# Patient Record
Sex: FEMALE | Race: WHITE | HISPANIC OR LATINO | Employment: UNEMPLOYED | URBAN - METROPOLITAN AREA
[De-identification: names, ages, dates, MRNs, and addresses within clinical notes are randomized per-mention and may not be internally consistent; named-entity substitution may affect disease eponyms.]

---

## 2024-05-19 ENCOUNTER — HOSPITAL ENCOUNTER (INPATIENT)
Facility: MEDICAL CENTER | Age: 29
LOS: 6 days | DRG: 438 | End: 2024-05-25
Attending: EMERGENCY MEDICINE | Admitting: INTERNAL MEDICINE

## 2024-05-19 ENCOUNTER — APPOINTMENT (OUTPATIENT)
Dept: RADIOLOGY | Facility: MEDICAL CENTER | Age: 29
DRG: 438 | End: 2024-05-19
Attending: EMERGENCY MEDICINE

## 2024-05-19 DIAGNOSIS — R93.5 ABNORMAL US (ULTRASOUND) OF ABDOMEN: ICD-10-CM

## 2024-05-19 DIAGNOSIS — K85.90 PANCREATITIS, UNSPECIFIED PANCREATITIS TYPE: ICD-10-CM

## 2024-05-19 DIAGNOSIS — D50.8 IRON DEFICIENCY ANEMIA SECONDARY TO INADEQUATE DIETARY IRON INTAKE: ICD-10-CM

## 2024-05-19 DIAGNOSIS — R41.3 MEMORY DEFICIT: ICD-10-CM

## 2024-05-19 DIAGNOSIS — F10.21 HISTORY OF ALCOHOLISM (HCC): ICD-10-CM

## 2024-05-19 DIAGNOSIS — R10.9 ACUTE ABDOMINAL PAIN: ICD-10-CM

## 2024-05-19 DIAGNOSIS — R93.5 ABNORMAL CT OF THE ABDOMEN: ICD-10-CM

## 2024-05-19 DIAGNOSIS — R63.0 POOR APPETITE: ICD-10-CM

## 2024-05-19 PROBLEM — E87.6 HYPOKALEMIA: Status: ACTIVE | Noted: 2024-05-19

## 2024-05-19 PROBLEM — G93.40 ENCEPHALOPATHY: Status: ACTIVE | Noted: 2024-05-19

## 2024-05-19 PROBLEM — D69.6 THROMBOCYTOPENIA (HCC): Status: ACTIVE | Noted: 2024-05-19

## 2024-05-19 PROBLEM — R73.9 HYPERGLYCEMIA: Status: ACTIVE | Noted: 2024-05-19

## 2024-05-19 LAB
ALBUMIN SERPL BCP-MCNC: 3.8 G/DL (ref 3.2–4.9)
ALBUMIN/GLOB SERPL: 0.9 G/DL
ALP SERPL-CCNC: 79 U/L (ref 30–99)
ALT SERPL-CCNC: 11 U/L (ref 2–50)
AMPHET UR QL SCN: NEGATIVE
ANION GAP SERPL CALC-SCNC: 15 MMOL/L (ref 7–16)
APPEARANCE UR: ABNORMAL
APPEARANCE UR: CLEAR
AST SERPL-CCNC: 17 U/L (ref 12–45)
BACTERIA #/AREA URNS HPF: ABNORMAL /HPF
BACTERIA #/AREA URNS HPF: ABNORMAL /HPF
BARBITURATES UR QL SCN: NEGATIVE
BASOPHILS # BLD AUTO: 0.2 % (ref 0–1.8)
BASOPHILS # BLD: 0.02 K/UL (ref 0–0.12)
BENZODIAZ UR QL SCN: NEGATIVE
BILIRUB SERPL-MCNC: 1 MG/DL (ref 0.1–1.5)
BILIRUB UR QL STRIP.AUTO: ABNORMAL
BILIRUB UR QL STRIP.AUTO: NEGATIVE
BUN SERPL-MCNC: 11 MG/DL (ref 8–22)
BZE UR QL SCN: NEGATIVE
CALCIUM ALBUM COR SERPL-MCNC: 9.4 MG/DL (ref 8.5–10.5)
CALCIUM SERPL-MCNC: 9.2 MG/DL (ref 8.5–10.5)
CANCER AG125 SERPL-ACNC: 37 U/ML (ref 0–35)
CANCER AG19-9 SERPL-ACNC: <2 U/ML (ref 0–35)
CANNABINOIDS UR QL SCN: NEGATIVE
CEA SERPL-MCNC: 1 NG/ML (ref 0–3)
CHLORIDE SERPL-SCNC: 101 MMOL/L (ref 96–112)
CO2 SERPL-SCNC: 20 MMOL/L (ref 20–33)
COLOR UR: YELLOW
COLOR UR: YELLOW
CREAT SERPL-MCNC: 0.49 MG/DL (ref 0.5–1.4)
EOSINOPHIL # BLD AUTO: 0.02 K/UL (ref 0–0.51)
EOSINOPHIL NFR BLD: 0.2 % (ref 0–6.9)
EPI CELLS #/AREA URNS HPF: ABNORMAL /HPF
EPI CELLS #/AREA URNS HPF: ABNORMAL /HPF
ERYTHROCYTE [DISTWIDTH] IN BLOOD BY AUTOMATED COUNT: 49.4 FL (ref 35.9–50)
EST. AVERAGE GLUCOSE BLD GHB EST-MCNC: 91 MG/DL
FENTANYL UR QL: NEGATIVE
GFR SERPLBLD CREATININE-BSD FMLA CKD-EPI: 131 ML/MIN/1.73 M 2
GLOBULIN SER CALC-MCNC: 4.3 G/DL (ref 1.9–3.5)
GLUCOSE BLD STRIP.AUTO-MCNC: 125 MG/DL (ref 65–99)
GLUCOSE SERPL-MCNC: 145 MG/DL (ref 65–99)
GLUCOSE UR STRIP.AUTO-MCNC: 100 MG/DL
GLUCOSE UR STRIP.AUTO-MCNC: NEGATIVE MG/DL
HBA1C MFR BLD: 4.8 % (ref 4–5.6)
HCG SERPL QL: NEGATIVE
HCT VFR BLD AUTO: 36 % (ref 37–47)
HGB BLD-MCNC: 12.1 G/DL (ref 12–16)
HYALINE CASTS #/AREA URNS LPF: ABNORMAL /LPF
HYALINE CASTS #/AREA URNS LPF: ABNORMAL /LPF
IMM GRANULOCYTES # BLD AUTO: 0.03 K/UL (ref 0–0.11)
IMM GRANULOCYTES NFR BLD AUTO: 0.3 % (ref 0–0.9)
KETONES UR STRIP.AUTO-MCNC: 15 MG/DL
KETONES UR STRIP.AUTO-MCNC: 15 MG/DL
LEUKOCYTE ESTERASE UR QL STRIP.AUTO: NEGATIVE
LEUKOCYTE ESTERASE UR QL STRIP.AUTO: NEGATIVE
LIPASE SERPL-CCNC: 94 U/L (ref 11–82)
LYMPHOCYTES # BLD AUTO: 1.01 K/UL (ref 1–4.8)
LYMPHOCYTES NFR BLD: 10.2 % (ref 22–41)
MCH RBC QN AUTO: 28.9 PG (ref 27–33)
MCHC RBC AUTO-ENTMCNC: 33.6 G/DL (ref 32.2–35.5)
MCV RBC AUTO: 85.9 FL (ref 81.4–97.8)
METHADONE UR QL SCN: NEGATIVE
MICRO URNS: ABNORMAL
MICRO URNS: ABNORMAL
MONOCYTES # BLD AUTO: 0.6 K/UL (ref 0–0.85)
MONOCYTES NFR BLD AUTO: 6.1 % (ref 0–13.4)
NEUTROPHILS # BLD AUTO: 8.18 K/UL (ref 1.82–7.42)
NEUTROPHILS NFR BLD: 83 % (ref 44–72)
NITRITE UR QL STRIP.AUTO: NEGATIVE
NITRITE UR QL STRIP.AUTO: NEGATIVE
NRBC # BLD AUTO: 0 K/UL
NRBC BLD-RTO: 0 /100 WBC (ref 0–0.2)
OPIATES UR QL SCN: NEGATIVE
OXYCODONE UR QL SCN: NEGATIVE
PCP UR QL SCN: NEGATIVE
PH UR STRIP.AUTO: 5.5 [PH] (ref 5–8)
PH UR STRIP.AUTO: 6 [PH] (ref 5–8)
PLATELET # BLD AUTO: 112 K/UL (ref 164–446)
PMV BLD AUTO: 9.6 FL (ref 9–12.9)
POTASSIUM SERPL-SCNC: 3.5 MMOL/L (ref 3.6–5.5)
PROPOXYPH UR QL SCN: NEGATIVE
PROT SERPL-MCNC: 8.1 G/DL (ref 6–8.2)
PROT UR QL STRIP: 30 MG/DL
PROT UR QL STRIP: 30 MG/DL
RBC # BLD AUTO: 4.19 M/UL (ref 4.2–5.4)
RBC # URNS HPF: ABNORMAL /HPF
RBC # URNS HPF: ABNORMAL /HPF
RBC UR QL AUTO: NEGATIVE
RBC UR QL AUTO: NEGATIVE
SODIUM SERPL-SCNC: 136 MMOL/L (ref 135–145)
SP GR UR STRIP.AUTO: 1.02
SP GR UR STRIP.AUTO: 1.03
TSH SERPL DL<=0.005 MIU/L-ACNC: 0.89 UIU/ML (ref 0.38–5.33)
UROBILINOGEN UR STRIP.AUTO-MCNC: 0.2 MG/DL
UROBILINOGEN UR STRIP.AUTO-MCNC: 1 MG/DL
WBC # BLD AUTO: 9.9 K/UL (ref 4.8–10.8)
WBC #/AREA URNS HPF: ABNORMAL /HPF
WBC #/AREA URNS HPF: ABNORMAL /HPF

## 2024-05-19 PROCEDURE — 99223 1ST HOSP IP/OBS HIGH 75: CPT | Performed by: INTERNAL MEDICINE

## 2024-05-19 RX ORDER — ONDANSETRON 4 MG/1
4 TABLET, ORALLY DISINTEGRATING ORAL ONCE
Status: COMPLETED | OUTPATIENT
Start: 2024-05-19 | End: 2024-05-19

## 2024-05-19 RX ORDER — PROCHLORPERAZINE EDISYLATE 5 MG/ML
5-10 INJECTION INTRAMUSCULAR; INTRAVENOUS EVERY 4 HOURS PRN
Status: DISCONTINUED | OUTPATIENT
Start: 2024-05-19 | End: 2024-05-25 | Stop reason: HOSPADM

## 2024-05-19 RX ORDER — ENOXAPARIN SODIUM 100 MG/ML
40 INJECTION SUBCUTANEOUS DAILY
Status: DISCONTINUED | OUTPATIENT
Start: 2024-05-19 | End: 2024-05-25 | Stop reason: HOSPADM

## 2024-05-19 RX ORDER — SODIUM CHLORIDE AND POTASSIUM CHLORIDE 300; 900 MG/100ML; MG/100ML
INJECTION, SOLUTION INTRAVENOUS CONTINUOUS
Status: DISCONTINUED | OUTPATIENT
Start: 2024-05-19 | End: 2024-05-20

## 2024-05-19 RX ORDER — LABETALOL HYDROCHLORIDE 5 MG/ML
10 INJECTION, SOLUTION INTRAVENOUS EVERY 4 HOURS PRN
Status: DISCONTINUED | OUTPATIENT
Start: 2024-05-19 | End: 2024-05-25 | Stop reason: HOSPADM

## 2024-05-19 RX ORDER — ONDANSETRON 4 MG/1
4 TABLET, ORALLY DISINTEGRATING ORAL EVERY 4 HOURS PRN
Status: DISCONTINUED | OUTPATIENT
Start: 2024-05-19 | End: 2024-05-25 | Stop reason: HOSPADM

## 2024-05-19 RX ORDER — LORAZEPAM 2 MG/ML
0.5 INJECTION INTRAMUSCULAR
Status: DISCONTINUED | OUTPATIENT
Start: 2024-05-19 | End: 2024-05-21

## 2024-05-19 RX ORDER — POLYETHYLENE GLYCOL 3350 17 G/17G
1 POWDER, FOR SOLUTION ORAL
Status: DISCONTINUED | OUTPATIENT
Start: 2024-05-19 | End: 2024-05-25 | Stop reason: HOSPADM

## 2024-05-19 RX ORDER — GAUZE BANDAGE 2" X 2"
100 BANDAGE TOPICAL DAILY
Status: DISCONTINUED | OUTPATIENT
Start: 2024-05-20 | End: 2024-05-22

## 2024-05-19 RX ORDER — OXYCODONE HYDROCHLORIDE 5 MG/1
5 TABLET ORAL
Status: DISCONTINUED | OUTPATIENT
Start: 2024-05-19 | End: 2024-05-25 | Stop reason: HOSPADM

## 2024-05-19 RX ORDER — OXYCODONE HYDROCHLORIDE 10 MG/1
10 TABLET ORAL
Status: DISCONTINUED | OUTPATIENT
Start: 2024-05-19 | End: 2024-05-25 | Stop reason: HOSPADM

## 2024-05-19 RX ORDER — MORPHINE SULFATE 4 MG/ML
4 INJECTION INTRAVENOUS
Status: DISCONTINUED | OUTPATIENT
Start: 2024-05-19 | End: 2024-05-25 | Stop reason: HOSPADM

## 2024-05-19 RX ORDER — PROMETHAZINE HYDROCHLORIDE 25 MG/1
12.5-25 TABLET ORAL EVERY 4 HOURS PRN
Status: DISCONTINUED | OUTPATIENT
Start: 2024-05-19 | End: 2024-05-25 | Stop reason: HOSPADM

## 2024-05-19 RX ORDER — ACETAMINOPHEN 500 MG
1000 TABLET ORAL ONCE
Status: COMPLETED | OUTPATIENT
Start: 2024-05-19 | End: 2024-05-19

## 2024-05-19 RX ORDER — ACETAMINOPHEN 325 MG/1
650 TABLET ORAL EVERY 6 HOURS PRN
Status: DISCONTINUED | OUTPATIENT
Start: 2024-05-19 | End: 2024-05-25 | Stop reason: HOSPADM

## 2024-05-19 RX ORDER — ONDANSETRON 2 MG/ML
4 INJECTION INTRAMUSCULAR; INTRAVENOUS EVERY 4 HOURS PRN
Status: DISCONTINUED | OUTPATIENT
Start: 2024-05-19 | End: 2024-05-25 | Stop reason: HOSPADM

## 2024-05-19 RX ORDER — AMOXICILLIN 250 MG
2 CAPSULE ORAL EVERY EVENING
Status: DISCONTINUED | OUTPATIENT
Start: 2024-05-19 | End: 2024-05-25 | Stop reason: HOSPADM

## 2024-05-19 RX ORDER — SUCRALFATE 1 G/1
1 TABLET ORAL EVERY 6 HOURS
Status: COMPLETED | OUTPATIENT
Start: 2024-05-19 | End: 2024-05-20

## 2024-05-19 RX ORDER — PROMETHAZINE HYDROCHLORIDE 25 MG/1
12.5-25 SUPPOSITORY RECTAL EVERY 4 HOURS PRN
Status: DISCONTINUED | OUTPATIENT
Start: 2024-05-19 | End: 2024-05-25 | Stop reason: HOSPADM

## 2024-05-19 RX ADMIN — IOHEXOL 95 ML: 350 INJECTION, SOLUTION INTRAVENOUS at 21:15

## 2024-05-19 RX ADMIN — ACETAMINOPHEN 1000 MG: 500 TABLET, FILM COATED ORAL at 19:15

## 2024-05-19 RX ADMIN — ENOXAPARIN SODIUM 40 MG: 100 INJECTION SUBCUTANEOUS at 23:01

## 2024-05-19 RX ADMIN — FAMOTIDINE 20 MG: 10 INJECTION, SOLUTION INTRAVENOUS at 23:00

## 2024-05-19 RX ADMIN — SUCRALFATE 1 G: 1 TABLET ORAL at 23:00

## 2024-05-19 RX ADMIN — ONDANSETRON 4 MG: 4 TABLET, ORALLY DISINTEGRATING ORAL at 19:15

## 2024-05-19 RX ADMIN — IOHEXOL 25 ML: 240 INJECTION, SOLUTION INTRATHECAL; INTRAVASCULAR; INTRAVENOUS; ORAL at 21:15

## 2024-05-19 RX ADMIN — POTASSIUM CHLORIDE AND SODIUM CHLORIDE: 900; 300 INJECTION, SOLUTION INTRAVENOUS at 23:13

## 2024-05-19 RX ADMIN — SENNOSIDES AND DOCUSATE SODIUM 2 TABLET: 50; 8.6 TABLET ORAL at 23:00

## 2024-05-19 ASSESSMENT — ENCOUNTER SYMPTOMS
DIARRHEA: 0
COUGH: 0
VOMITING: 1
PHOTOPHOBIA: 0
HEARTBURN: 0
FOCAL WEAKNESS: 0
FEVER: 0
ABDOMINAL PAIN: 1
NAUSEA: 1
ORTHOPNEA: 0
HEADACHES: 0
DOUBLE VISION: 0
NECK PAIN: 0
NERVOUS/ANXIOUS: 0
TREMORS: 0
BRUISES/BLEEDS EASILY: 0
BACK PAIN: 0
HEMOPTYSIS: 0
CHILLS: 0
BLURRED VISION: 0
HALLUCINATIONS: 0
SPUTUM PRODUCTION: 0
POLYDIPSIA: 0
SPEECH CHANGE: 0
PALPITATIONS: 0
WEIGHT LOSS: 0
FLANK PAIN: 0

## 2024-05-19 ASSESSMENT — COGNITIVE AND FUNCTIONAL STATUS - GENERAL
SUGGESTED CMS G CODE MODIFIER MOBILITY: CH
DAILY ACTIVITIY SCORE: 24
MOBILITY SCORE: 24
SUGGESTED CMS G CODE MODIFIER DAILY ACTIVITY: CH

## 2024-05-19 ASSESSMENT — PAIN DESCRIPTION - PAIN TYPE: TYPE: ACUTE PAIN

## 2024-05-19 ASSESSMENT — LIFESTYLE VARIABLES: SUBSTANCE_ABUSE: 0

## 2024-05-19 ASSESSMENT — FIBROSIS 4 INDEX: FIB4 SCORE: 1.281423214455495396

## 2024-05-19 NOTE — ED TRIAGE NOTES
Ambulatory to triage w/ father w/ c/o low abdominal pain, N/V/D x 2 days.   Patient recently moved moved to [place] 1.5 months ago.  Hx of etoh abuse, spent time in a detox facility prior to moving here, last drink 4 months ago.  Patient somewhat groggy and withdrawn at triage.  Reports depression due to the state of her declining health.  Denies SI.

## 2024-05-20 ENCOUNTER — APPOINTMENT (OUTPATIENT)
Dept: RADIOLOGY | Facility: MEDICAL CENTER | Age: 29
DRG: 438 | End: 2024-05-20
Attending: INTERNAL MEDICINE

## 2024-05-20 PROBLEM — R41.3 MEMORY DEFICIT: Status: ACTIVE | Noted: 2024-05-20

## 2024-05-20 LAB
ALBUMIN SERPL BCP-MCNC: 3.9 G/DL (ref 3.2–4.9)
ALBUMIN/GLOB SERPL: 1 G/DL
ALP SERPL-CCNC: 80 U/L (ref 30–99)
ALT SERPL-CCNC: 11 U/L (ref 2–50)
ANION GAP SERPL CALC-SCNC: 14 MMOL/L (ref 7–16)
AST SERPL-CCNC: 16 U/L (ref 12–45)
BASOPHILS # BLD AUTO: 0.2 % (ref 0–1.8)
BASOPHILS # BLD: 0.02 K/UL (ref 0–0.12)
BILIRUB SERPL-MCNC: 0.9 MG/DL (ref 0.1–1.5)
BUN SERPL-MCNC: 8 MG/DL (ref 8–22)
CALCIUM ALBUM COR SERPL-MCNC: 9.1 MG/DL (ref 8.5–10.5)
CALCIUM SERPL-MCNC: 9 MG/DL (ref 8.5–10.5)
CHLORIDE SERPL-SCNC: 100 MMOL/L (ref 96–112)
CO2 SERPL-SCNC: 23 MMOL/L (ref 20–33)
CREAT SERPL-MCNC: 0.46 MG/DL (ref 0.5–1.4)
EOSINOPHIL # BLD AUTO: 0.06 K/UL (ref 0–0.51)
EOSINOPHIL NFR BLD: 0.7 % (ref 0–6.9)
ERYTHROCYTE [DISTWIDTH] IN BLOOD BY AUTOMATED COUNT: 50.8 FL (ref 35.9–50)
ETHANOL BLD-MCNC: <10.1 MG/DL
GFR SERPLBLD CREATININE-BSD FMLA CKD-EPI: 133 ML/MIN/1.73 M 2
GLOBULIN SER CALC-MCNC: 4.1 G/DL (ref 1.9–3.5)
GLUCOSE SERPL-MCNC: 104 MG/DL (ref 65–99)
HCT VFR BLD AUTO: 38.4 % (ref 37–47)
HGB BLD-MCNC: 12.5 G/DL (ref 12–16)
IMM GRANULOCYTES # BLD AUTO: 0.05 K/UL (ref 0–0.11)
IMM GRANULOCYTES NFR BLD AUTO: 0.6 % (ref 0–0.9)
LYMPHOCYTES # BLD AUTO: 1.89 K/UL (ref 1–4.8)
LYMPHOCYTES NFR BLD: 21.2 % (ref 22–41)
MCH RBC QN AUTO: 28.5 PG (ref 27–33)
MCHC RBC AUTO-ENTMCNC: 32.6 G/DL (ref 32.2–35.5)
MCV RBC AUTO: 87.7 FL (ref 81.4–97.8)
MONOCYTES # BLD AUTO: 0.59 K/UL (ref 0–0.85)
MONOCYTES NFR BLD AUTO: 6.6 % (ref 0–13.4)
NEUTROPHILS # BLD AUTO: 6.3 K/UL (ref 1.82–7.42)
NEUTROPHILS NFR BLD: 70.7 % (ref 44–72)
NRBC # BLD AUTO: 0 K/UL
NRBC BLD-RTO: 0 /100 WBC (ref 0–0.2)
PLATELET # BLD AUTO: 130 K/UL (ref 164–446)
PMV BLD AUTO: 9.6 FL (ref 9–12.9)
POTASSIUM SERPL-SCNC: 3.5 MMOL/L (ref 3.6–5.5)
PROT SERPL-MCNC: 8 G/DL (ref 6–8.2)
RBC # BLD AUTO: 4.38 M/UL (ref 4.2–5.4)
SODIUM SERPL-SCNC: 137 MMOL/L (ref 135–145)
WBC # BLD AUTO: 8.9 K/UL (ref 4.8–10.8)

## 2024-05-20 PROCEDURE — 99233 SBSQ HOSP IP/OBS HIGH 50: CPT | Performed by: INTERNAL MEDICINE

## 2024-05-20 RX ORDER — SODIUM CHLORIDE 9 MG/ML
INJECTION, SOLUTION INTRAVENOUS CONTINUOUS
Status: DISCONTINUED | OUTPATIENT
Start: 2024-05-20 | End: 2024-05-21

## 2024-05-20 RX ORDER — FAMOTIDINE 20 MG/1
20 TABLET, FILM COATED ORAL 2 TIMES DAILY
Status: DISCONTINUED | OUTPATIENT
Start: 2024-05-20 | End: 2024-05-25 | Stop reason: HOSPADM

## 2024-05-20 RX ADMIN — ENOXAPARIN SODIUM 40 MG: 100 INJECTION SUBCUTANEOUS at 18:22

## 2024-05-20 RX ADMIN — SODIUM CHLORIDE: 9 INJECTION, SOLUTION INTRAVENOUS at 18:22

## 2024-05-20 RX ADMIN — FAMOTIDINE 20 MG: 20 TABLET, FILM COATED ORAL at 18:22

## 2024-05-20 RX ADMIN — GADOTERIDOL 10 ML: 279.3 INJECTION, SOLUTION INTRAVENOUS at 16:00

## 2024-05-20 RX ADMIN — SUCRALFATE 1 G: 1 TABLET ORAL at 05:41

## 2024-05-20 RX ADMIN — SENNOSIDES AND DOCUSATE SODIUM 2 TABLET: 50; 8.6 TABLET ORAL at 18:22

## 2024-05-20 RX ADMIN — ACETAMINOPHEN 650 MG: 325 TABLET, FILM COATED ORAL at 10:02

## 2024-05-20 RX ADMIN — FAMOTIDINE 20 MG: 10 INJECTION, SOLUTION INTRAVENOUS at 05:41

## 2024-05-20 RX ADMIN — SUCRALFATE 1 G: 1 TABLET ORAL at 14:41

## 2024-05-20 RX ADMIN — POTASSIUM CHLORIDE AND SODIUM CHLORIDE: 900; 300 INJECTION, SOLUTION INTRAVENOUS at 10:03

## 2024-05-20 RX ADMIN — Medication 100 MG: at 05:41

## 2024-05-20 RX ADMIN — ACETAMINOPHEN 650 MG: 325 TABLET, FILM COATED ORAL at 22:24

## 2024-05-20 ASSESSMENT — ENCOUNTER SYMPTOMS
ABDOMINAL PAIN: 1
SHORTNESS OF BREATH: 0
HEADACHES: 0
DOUBLE VISION: 0
FALLS: 0
BACK PAIN: 0
SEIZURES: 0
VOMITING: 0
PALPITATIONS: 0
CHILLS: 0
COUGH: 0
NAUSEA: 1
BLURRED VISION: 0
FEVER: 0
SORE THROAT: 0

## 2024-05-20 ASSESSMENT — PAIN DESCRIPTION - PAIN TYPE
TYPE: ACUTE PAIN

## 2024-05-20 NOTE — CARE PLAN
The patient is Watcher - Medium risk of patient condition declining or worsening    Shift Goals  Clinical Goals: pain control, IVF, MRI  Patient Goals: rest  Family Goals: dieudonne    Progress made toward(s) clinical / shift goals:      Pain control adequate per current regimen, IVF infusing     Patient is not progressing towards the following goals:

## 2024-05-20 NOTE — ED NOTES
Med rec updated and complete. Allergies reviewed. Confirmed name and date of birth. Interviewed pt/family at bedside.   No anticoagulant medications.   No outpatient antibiotic use in last 30 days.      Home pharmacy  [facility] [phone]

## 2024-05-20 NOTE — ASSESSMENT & PLAN NOTE
MRCP: large multiloculated fluid collection replacing the pancreatic parenchyma. This could be related to prior pancreatitis or walled-off necrosis.   - Discussed with GI today; continue with non operative management   - Continue medical management at this time   - If increasing WBC, fevers, change in abdominal exam - consider drainage

## 2024-05-20 NOTE — PROGRESS NOTES
Assumed care of patient at shift change. Assessment completed, personal belongings and call light within reach within reach.   Plan for shift includes MRI of abd - scheduled for 11am. Pt made NPO at this time until MRI completed - patient aware and agreeable to plan. Will monitor.

## 2024-05-20 NOTE — PROGRESS NOTES
Received report from ED RN. Arrived to unit by stretcher accompanied  by transport staff. Ambulates to room, steady gait noted. AAO to self, poor historian, states.does not remember what happened and why she is at the hospital, states does not remember the current year, does not remember when had  the last MB, reports no nausea or vomiting, reports cramps to lower abdomen. Patient in full liquid diet. Water pro vided. Ambulates to BR. Bed locked and in lower position. Bed alarm on. Educated in the use of the call light. No evidence of learning. Patient's room close to nursing station. Hourly monitoring.

## 2024-05-20 NOTE — ED NOTES
Bedside report to Kimberley BABIN.  Pt is Ox3, aware of need for urine sample.  Pt has call light, does not require oxygen.  Falls precautions reinforced.

## 2024-05-20 NOTE — CARE PLAN
The patient is Stable - Low risk of patient condition declining or worsening    Shift Goals  Clinical Goals: monitor symptoms. N&V. abd. pain  Patient Goals: rest  Family Goals: dieudonne    Progress made toward(s) clinical / shift goals:  patient reports no nausea. Patient reports tenderness to lower abdomen, declines need for analgesic. Receiving fluids therapy as per MAR.     Patient is not progressing towards the following goals:

## 2024-05-20 NOTE — ASSESSMENT & PLAN NOTE
Platelet 133  Secondary to alcohol abuse   - Monitor closely for signs of bleeding  - Repeat CBC in AM

## 2024-05-20 NOTE — H&P
Hospital Medicine History & Physical Note    Date of Service  5/19/2024    Primary Care Physician  Pcp Pt States None        Code Status  Full Code    Chief Complaint  Chief Complaint   Patient presents with    Abdominal Pain    Nausea/Vomiting/Diarrhea       History of Presenting Illness  Belinda Rodriguez is a 29 y.o. female history of endometriosis, depression, alcohol abuse in reported remission who presented 5/19/2024 with complaints of nausea, vomiting, mid to lower abdominal pain for 2 days without elevating aggravating factors, some lower back pain, mild dysuria, diarrhea, fever or vaginal discharge.  Patient appears to be poor historian and complains of memory loss.  She could not tell me when she had last BM  Blood work showed some lipase elevation 94.  UA showed ketones, proteins, few bacteria but no pyuria, negative excite esterase and nitrates.  Right upper quadrant ultrasound: Tubular fluid-filled fractions abdomen up to 6 cm.  Evidence of portal hypertension with collateral veins in the liver and spleen.  CT of the abdomen and pelvis with contrast: Multiloculated cystic complex structure that could represent large pseudocyst, cystic pancreatic lesion is noted excluded, recommended MRI with contrast.  Slight peripancreatic fat stranding.  Mild intrahepatic biliary duct dilation.  Trace free fluid collection in the pelvis.  I discussed the plan of care with patient and ERP .    Review of Systems  Review of Systems   Constitutional:  Negative for chills, fever and weight loss.   HENT:  Negative for ear pain, hearing loss and tinnitus.    Eyes:  Negative for blurred vision, double vision and photophobia.   Respiratory:  Negative for cough, hemoptysis and sputum production.    Cardiovascular:  Negative for chest pain, palpitations and orthopnea.   Gastrointestinal:  Positive for abdominal pain, nausea and vomiting. Negative for diarrhea and heartburn.   Genitourinary:  Negative for dysuria, flank pain,  frequency and hematuria.   Musculoskeletal:  Positive for joint pain. Negative for back pain and neck pain.   Skin:  Negative for itching and rash.   Neurological:  Negative for tremors, speech change, focal weakness and headaches.   Endo/Heme/Allergies:  Negative for environmental allergies and polydipsia. Does not bruise/bleed easily.   Psychiatric/Behavioral:  Negative for hallucinations and substance abuse. The patient is not nervous/anxious.        Past Medical History   has a past medical history of Alcohol abuse and Depression.    Surgical History   has no past surgical history on file.     Family History  family history is not on file.   Family history reviewed with patient. There is no family history that is pertinent to the chief complaint.     Social History   reports that she has quit smoking. Her smoking use included cigarettes. She does not have any smokeless tobacco history on file. She reports that she does not currently use alcohol. She reports that she does not currently use drugs.    Allergies  Allergies   Allergen Reactions    Peach [Prunus Persica]        Medications  None       Physical Exam  Temp:  [36.7 °C (98 °F)] 36.7 °C (98 °F)  Pulse:  [] 105  Resp:  [12-17] 17  BP: (106-117)/(67-86) 116/77  SpO2:  [97 %-100 %] 98 %  Blood Pressure: 116/77   Temperature: 36.7 °C (98 °F)   Pulse: (!) 105   Respiration: 17   Pulse Oximetry: 98 %       Physical Exam  Vitals and nursing note reviewed.   Constitutional:       General: She is not in acute distress.     Appearance: Normal appearance.   HENT:      Head: Normocephalic and atraumatic.      Nose: Nose normal.      Mouth/Throat:      Mouth: Mucous membranes are dry.   Eyes:      Extraocular Movements: Extraocular movements intact.      Pupils: Pupils are equal, round, and reactive to light.   Cardiovascular:      Rate and Rhythm: Normal rate and regular rhythm.   Pulmonary:      Effort: Pulmonary effort is normal.      Breath sounds: Normal  "breath sounds.   Abdominal:      General: Abdomen is flat. There is no distension.      Tenderness: There is abdominal tenderness in the epigastric area and periumbilical area. There is no guarding or rebound.   Musculoskeletal:         General: No swelling or deformity. Normal range of motion.      Cervical back: Normal range of motion and neck supple.   Skin:     General: Skin is warm and dry.   Neurological:      General: No focal deficit present.      Mental Status: She is alert and oriented to person, place, and time.   Psychiatric:         Mood and Affect: Mood normal.         Behavior: Behavior normal.         Laboratory:  Recent Labs     05/19/24  1705   WBC 9.9   RBC 4.19*   HEMOGLOBIN 12.1   HEMATOCRIT 36.0*   MCV 85.9   MCH 28.9   MCHC 33.6   RDW 49.4   PLATELETCT 112*   MPV 9.6     Recent Labs     05/19/24  1705   SODIUM 136   POTASSIUM 3.5*   CHLORIDE 101   CO2 20   GLUCOSE 145*   BUN 11   CREATININE 0.49*   CALCIUM 9.2     Recent Labs     05/19/24  1705   ALTSGPT 11   ASTSGOT 17   ALKPHOSPHAT 79   TBILIRUBIN 1.0   LIPASE 94*   GLUCOSE 145*         No results for input(s): \"NTPROBNP\" in the last 72 hours.      No results for input(s): \"TROPONINT\" in the last 72 hours.    Imaging:  CT-ABDOMEN-PELVIS WITH   Final Result         1.  Large complex multiloculated cystic structure in the pancreas, could represent large pseudocyst, however other cystic pancreatic lesion is not definitively excluded. Recommend follow-up MRI of the pancreas with contrast for further characterization    as clinically appropriate.   2.  Slight peripancreatic fat stranding, consider component of pancreatitis.   3.  Mild intrahepatic biliary ductal dilatation   4.  Trace free fluid collection the pelvis      These findings were discussed with the patient's clinician, Brendan Multani, on 5/19/2024 9:02 PM.      US-RUQ   Final Result      1.  Tubular fluid-filled structure at the midline abdomen measuring up to 6 cm transverse. This may " be aberrantly dilated bowel. Recommend further assessment with CT abdomen and pelvis.   2.  Bidirectional flow in the main portal vein which is suspicious for portal hypertension.   3.  Collateral veins at the liver and spleen.   4.  The liver is diffusely increased in echogenicity.  This is nonspecific though can be seen with fatty infiltration or other hepatocellular disease.      MR-ABDOMEN-WITH & W/O    (Results Pending)           Assessment/Plan:  Justification for Admission Status  I anticipate this patient will require at least two midnights for appropriate medical management, necessitating inpatient admission because pancreatic lesion/cyst    Patient will need a Med/Surg bed on MEDICAL service .  The need is secondary to pancreatic cyst.    * Pancreatitis, pancreatic cyst- (present on admission)  Assessment & Plan  CT of the abdomen pelvis with contrast showed evidence of pancreatitis, as well as complex cystic structure requiring further evaluation with MRI of the brain abdomen with contrast  Since biliary ducts dilated, will order MRI with and without contrast to evaluate biliary duct and pancreatic cyst  Admitted for pain control rehydration  IV fluid support  Pepcid, sucralfate, Zofran as needed    Encephalopathy  Assessment & Plan  Toxic/metabolic.  Appears to have short term memory loss  Will check alcohol level, UDS, will give thiamine supplementation    Hypokalemia  Assessment & Plan  Potassium 3.5  Replacement ordered    Hyperglycemia  Assessment & Plan  Glucose 145.  Possibly developing diabetes due to chronic pancreatitis  Will check A1c    Thrombocytopenia (HCC)  Assessment & Plan  Platelets count 112.  Probably secondary to chronic liver disease.        VTE prophylaxis: enoxaparin ppx

## 2024-05-20 NOTE — ED PROVIDER NOTES
"ER Provider Note    Scribed for Brendan Multani M.d. by Rebel Bernabe. 5/19/2024  5:29 PM    Primary Care Provider: Pcp Pt States None    CHIEF COMPLAINT   Chief Complaint   Patient presents with    Abdominal Pain    Nausea/Vomiting/Diarrhea         HPI/ROS  LIMITATION TO HISTORY   Select: : None  OUTSIDE HISTORIAN(S):  Parent father at bedside to confirm sequence of events and collateral information as detailed below.    Belinda Rodriguez is a 29 y.o. female with a history of endometriosis who presents to the ED for evaluation of mid to lower abdominal pain onset 2 days ago. The patient states she also has lower back pain, mild pain with urination, nausea, vomiting, and depressed mood, but denies any suicidal ideation, diarrhea, new vaginal bleeding or discharge. She notes that her pain is more localized to the right than left, however it is present on both sides. She has been able to tolerate some food, but only small things such as crackers and bread. She has been able to tolerate Gatorade. She describes a history of alcohol abuse, and adds that she spent some time in a detox facility prior to moving to [place] 1.5 months ago. She adds that her last recreational alcoholic drink was 4 months ago. She denies any history of abdominal surgeries. She denies any possibility of pregnancy, stating that her menstrual period ended a few days ago. She has a history of smoking cigarettes recreationally, but notes that she typically smokes \"a pack or less per day\" but she has not done so since moving to [place].    PAST MEDICAL HISTORY  Past Medical History:   Diagnosis Date    Alcohol abuse     Depression      SURGICAL HISTORY  History reviewed. No pertinent surgical history.    FAMILY HISTORY  History reviewed. No pertinent family history.    SOCIAL HISTORY   reports that she has quit smoking. Her smoking use included cigarettes. She does not have any smokeless tobacco history on file. She reports that she does not currently use " "alcohol. She reports that she does not currently use drugs.    CURRENT MEDICATIONS  No current outpatient medications     ALLERGIES  Peach [prunus persica]    PHYSICAL EXAM  /74   Pulse (!) 105   Temp 36.7 °C (98 °F) (Temporal)   Resp 12   Ht 1.575 m (5' 2\")   Wt 51.8 kg (114 lb 3.2 oz)   LMP 04/19/2024   SpO2 97%   BMI 20.89 kg/m²   Constitutional: Alert in no apparent distress.  HENT: No signs of trauma, Bilateral external ears normal, Nose normal. Uvula midline.   Eyes: Pupils are equal and reactive, Conjunctiva normal, Non-icteric.   Neck: Normal range of motion, No tenderness, Supple, No stridor.   Lymphatic: No lymphadenopathy noted.   Cardiovascular: Regular rate and rhythm, no murmurs.   Thorax & Lungs: Normal breath sounds, No respiratory distress, No wheezing, No chest tenderness.   Abdomen: Bowel sounds normal, Soft, right upper quadrant tenderness to palpation., No peritoneal signs, No masses, No pulsatile masses.   Skin: Warm, Dry, No erythema, No rash.   Back: No bony tenderness, No CVA tenderness.   Extremities: Intact distal pulses, No edema, No tenderness, No cyanosis.  Musculoskeletal: Good range of motion in all major joints. No tenderness to palpation or major deformities noted.   Neurologic: Alert , Normal motor function, Normal sensory function, No focal deficits noted.   Psychiatric: Affect normal, Judgment normal, Mood normal.      DIAGNOSTIC STUDIES    EKG/LABS  Results for orders placed or performed during the hospital encounter of 05/19/24   CBC WITH DIFFERENTIAL   Result Value Ref Range    WBC 9.9 4.8 - 10.8 K/uL    RBC 4.19 (L) 4.20 - 5.40 M/uL    Hemoglobin 12.1 12.0 - 16.0 g/dL    Hematocrit 36.0 (L) 37.0 - 47.0 %    MCV 85.9 81.4 - 97.8 fL    MCH 28.9 27.0 - 33.0 pg    MCHC 33.6 32.2 - 35.5 g/dL    RDW 49.4 35.9 - 50.0 fL    Platelet Count 112 (L) 164 - 446 K/uL    MPV 9.6 9.0 - 12.9 fL    Neutrophils-Polys 83.00 (H) 44.00 - 72.00 %    Lymphocytes 10.20 (L) 22.00 - 41.00 " %    Monocytes 6.10 0.00 - 13.40 %    Eosinophils 0.20 0.00 - 6.90 %    Basophils 0.20 0.00 - 1.80 %    Immature Granulocytes 0.30 0.00 - 0.90 %    Nucleated RBC 0.00 0.00 - 0.20 /100 WBC    Neutrophils (Absolute) 8.18 (H) 1.82 - 7.42 K/uL    Lymphs (Absolute) 1.01 1.00 - 4.80 K/uL    Monos (Absolute) 0.60 0.00 - 0.85 K/uL    Eos (Absolute) 0.02 0.00 - 0.51 K/uL    Baso (Absolute) 0.02 0.00 - 0.12 K/uL    Immature Granulocytes (abs) 0.03 0.00 - 0.11 K/uL    NRBC (Absolute) 0.00 K/uL   COMP METABOLIC PANEL   Result Value Ref Range    Sodium 136 135 - 145 mmol/L    Potassium 3.5 (L) 3.6 - 5.5 mmol/L    Chloride 101 96 - 112 mmol/L    Co2 20 20 - 33 mmol/L    Anion Gap 15.0 7.0 - 16.0    Glucose 145 (H) 65 - 99 mg/dL    Bun 11 8 - 22 mg/dL    Creatinine 0.49 (L) 0.50 - 1.40 mg/dL    Calcium 9.2 8.5 - 10.5 mg/dL    Correct Calcium 9.4 8.5 - 10.5 mg/dL    AST(SGOT) 17 12 - 45 U/L    ALT(SGPT) 11 2 - 50 U/L    Alkaline Phosphatase 79 30 - 99 U/L    Total Bilirubin 1.0 0.1 - 1.5 mg/dL    Albumin 3.8 3.2 - 4.9 g/dL    Total Protein 8.1 6.0 - 8.2 g/dL    Globulin 4.3 (H) 1.9 - 3.5 g/dL    A-G Ratio 0.9 g/dL   LIPASE   Result Value Ref Range    Lipase 94 (H) 11 - 82 U/L   HCG QUAL SERUM   Result Value Ref Range    Beta-Hcg Qualitative Serum Negative Negative   URINALYSIS,CULTURE IF INDICATED    Specimen: Urine   Result Value Ref Range    Color Yellow     Character Cloudy (A)     Specific Gravity 1.025 <1.035    Ph 5.5 5.0 - 8.0    Glucose 100 (A) Negative mg/dL    Ketones 15 (A) Negative mg/dL    Protein 30 (A) Negative mg/dL    Bilirubin Small (A) Negative    Urobilinogen, Urine 0.2 Negative    Nitrite Negative Negative    Leukocyte Esterase Negative Negative    Occult Blood Negative Negative    Micro Urine Req Microscopic    URINE MICROSCOPIC (W/UA)   Result Value Ref Range    WBC 2-5 /hpf    RBC 2-5 (A) /hpf    Bacteria Few (A) None /hpf    Epithelial Cells Many (A) /hpf    Hyaline Cast 0-2 /lpf   ESTIMATED GFR   Result  Value Ref Range    GFR (CKD-EPI) 131 >60 mL/min/1.73 m 2   URINALYSIS    Specimen: Urine   Result Value Ref Range    Color Yellow     Character Clear     Specific Gravity 1.029 <1.035    Ph 6.0 5.0 - 8.0    Glucose Negative Negative mg/dL    Ketones 15 (A) Negative mg/dL    Protein 30 (A) Negative mg/dL    Bilirubin Negative Negative    Urobilinogen, Urine 1.0 Negative    Nitrite Negative Negative    Leukocyte Esterase Negative Negative    Occult Blood Negative Negative    Micro Urine Req Microscopic    URINE MICROSCOPIC (W/UA)   Result Value Ref Range    WBC 0-2 /hpf    RBC 0-2 /hpf    Bacteria Few (A) None /hpf    Epithelial Cells Few /hpf    Hyaline Cast 0-2 /lpf   POCT glucose device results   Result Value Ref Range    POC Glucose, Blood 125 (H) 65 - 99 mg/dL     I have independently interpreted this EKG    RADIOLOGY/PROCEDURES   The attending emergency physician has independently interpreted the diagnostic imaging associated with this visit and am waiting the final reading from the radiologist.   My preliminary interpretation is a follows: No cholecycstitis    Radiologist interpretation:  US-RUQ   Final Result      1.  Tubular fluid-filled structure at the midline abdomen measuring up to 6 cm transverse. This may be aberrantly dilated bowel. Recommend further assessment with CT abdomen and pelvis.   2.  Bidirectional flow in the main portal vein which is suspicious for portal hypertension.   3.  Collateral veins at the liver and spleen.   4.  The liver is diffusely increased in echogenicity.  This is nonspecific though can be seen with fatty infiltration or other hepatocellular disease.      CT-ABDOMEN-PELVIS WITH    (Results Pending)     COURSE & MEDICAL DECISION MAKING     ASSESSMENT, COURSE AND PLAN  Care Narrative:     5:32 PM - Patient was evaluated at bedside. Ordered for US-RUQ, CBC w/diff, CMP, Lipase, HCG Qual, and UA w/ culture if indicated to evaluate. The patient will be medicated with Tylenol 1000  mg PO and Zofran 4 mg PO for her symptoms. Patient verbalizes understanding and support with my plan of care.         #abdominal pain    US-RUQ ordered in order to rule out cholecystitis  None seen but given abnormal US plan for CT    No RLQ pain, TTP or fever to suggest appendicitis  No LLQ pain or TTP or fever to suggest diverticulitis  No pain at of proportion to suggest mesenteric ischemia  No e/o rash or zoster  No e/o UTI  No significant Elevation in lipase to suggest pancreatitis      DISPOSITION AND DISCUSSIONS    Patient care signed out to oncoming attending Dr. Dickens pending CT scan and if CT scan with no surgical process then would consider discharge     I have discussed management of the patient with the following physicians and SEDA's:  None    Discussion of management with other Q or appropriate source(s): None         The patient is referred to a primary physician for blood pressure management, diabetic screening, and for all other preventative health concerns.    DISPOSITION:  Patient will be discharged home in stable condition.    FOLLOW UP:  No follow-up provider specified.    OUTPATIENT MEDICATIONS:  New Prescriptions    No medications on file      FINAL DIANGOSIS  1. Acute abdominal pain    2. Abnormal US (ultrasound) of abdomen         Rebel GRIJALVA (Mihaelaibmilton), am scribing for, and in the presence of, Brendan Multani M.D..    Electronically signed by: Rebel Bernabe (Jack), 5/19/2024    IBrendan M.D. personally performed the services described in this documentation, as scribed by Rebel Bernabe in my presence, and it is both accurate and complete.      The note accurately reflects work and decisions made by me.  Brendan Multani M.D.  5/19/2024  8:53 PM

## 2024-05-20 NOTE — PROGRESS NOTES
Hospital Medicine Daily Progress Note    Date of Service  5/20/2024    Chief Complaint  Belinda Rodriguez is a 29 y.o. female admitted 5/19/2024 with abdominal pain    Hospital Course  Belinda Rodriguez is a 29 y.o. female history of endometriosis, depression, alcohol abuse in reported remission who presented 5/19/2024 with complaints of nausea, vomiting, mid to lower abdominal pain for 2 days without elevating aggravating factors, some lower back pain, mild dysuria, diarrhea, fever or vaginal discharge.  Patient appears to be poor historian and complains of memory loss.  She could not tell me when she had last BM  Blood work showed some lipase elevation 94.  UA showed ketones, proteins, few bacteria but no pyuria, negative excite esterase and nitrates.  Right upper quadrant ultrasound: Tubular fluid-filled fractions abdomen up to 6 cm.  Evidence of portal hypertension with collateral veins in the liver and spleen.  CT of the abdomen and pelvis with contrast: Multiloculated cystic complex structure that could represent large pseudocyst, cystic pancreatic lesion is noted excluded, recommended MRI with contrast.  Slight peripancreatic fat stranding.  Mild intrahepatic biliary duct dilation.  Trace free fluid collection in the pelvis.  I discussed the plan of care with patient and ERP .    Interval Problem Update  Patient was seen and examined at bedside.  No acute events overnight. Patient is resting comfortably in bed and in no acute distress.     IV fluids  Await MRCP  Clear liquid diet    I have discussed this patient's plan of care and discharge plan at IDT rounds today with Case Management, Nursing, Nursing leadership, and other members of the IDT team.    Code Status  Full Code    Disposition  The patient is not medically cleared for discharge to home or a post-acute facility.      I have placed the appropriate orders for post-discharge needs.    Review of Systems  Review of Systems   Constitutional:  Positive for  malaise/fatigue. Negative for chills and fever.   HENT:  Negative for congestion and sore throat.    Eyes:  Negative for blurred vision and double vision.   Respiratory:  Negative for cough and shortness of breath.    Cardiovascular:  Negative for chest pain and palpitations.   Gastrointestinal:  Positive for abdominal pain and nausea. Negative for vomiting.   Genitourinary:  Negative for dysuria and frequency.   Musculoskeletal:  Negative for back pain and falls.   Neurological:  Negative for seizures and headaches.        Physical Exam  Temp:  [36.2 °C (97.2 °F)-36.9 °C (98.5 °F)] 36.2 °C (97.2 °F)  Pulse:  [] 81  Resp:  [12-18] 17  BP: (100-141)/(63-86) 113/73  SpO2:  [95 %-100 %] 97 %    Physical Exam  Vitals and nursing note reviewed.   Constitutional:       General: She is not in acute distress.     Appearance: She is not toxic-appearing.      Comments: Poor historian   HENT:      Right Ear: External ear normal.      Left Ear: External ear normal.      Nose: No congestion.      Mouth/Throat:      Pharynx: No oropharyngeal exudate.   Eyes:      General: No scleral icterus.  Cardiovascular:      Rate and Rhythm: Normal rate.      Heart sounds: No murmur heard.  Pulmonary:      Breath sounds: No wheezing.   Abdominal:      Palpations: Abdomen is soft.      Tenderness: There is no abdominal tenderness. There is no guarding or rebound.   Musculoskeletal:         General: No swelling or deformity.   Skin:     Coloration: Skin is not jaundiced.      Findings: No bruising.   Neurological:      General: No focal deficit present.      Mental Status: She is alert.      Motor: No weakness.   Psychiatric:         Mood and Affect: Mood normal.         Behavior: Behavior normal.         Fluids    Intake/Output Summary (Last 24 hours) at 5/20/2024 1555  Last data filed at 5/20/2024 0800  Gross per 24 hour   Intake 976.95 ml   Output 2 ml   Net 974.95 ml       Laboratory  Recent Labs     05/19/24  1705 05/20/24  0039    WBC 9.9 8.9   RBC 4.19* 4.38   HEMOGLOBIN 12.1 12.5   HEMATOCRIT 36.0* 38.4   MCV 85.9 87.7   MCH 28.9 28.5   MCHC 33.6 32.6   RDW 49.4 50.8*   PLATELETCT 112* 130*   MPV 9.6 9.6     Recent Labs     05/19/24  1705 05/20/24  0039   SODIUM 136 137   POTASSIUM 3.5* 3.5*   CHLORIDE 101 100   CO2 20 23   GLUCOSE 145* 104*   BUN 11 8   CREATININE 0.49* 0.46*   CALCIUM 9.2 9.0                   Imaging  CT-ABDOMEN-PELVIS WITH   Final Result         1.  Large complex multiloculated cystic structure in the pancreas, could represent large pseudocyst, however other cystic pancreatic lesion is not definitively excluded. Recommend follow-up MRI of the pancreas with contrast for further characterization    as clinically appropriate.   2.  Slight peripancreatic fat stranding, consider component of pancreatitis.   3.  Mild intrahepatic biliary ductal dilatation   4.  Trace free fluid collection the pelvis      These findings were discussed with the patient's clinician, Brendan Multani, on 5/19/2024 9:02 PM.      US-RUQ   Final Result      1.  Tubular fluid-filled structure at the midline abdomen measuring up to 6 cm transverse. This may be aberrantly dilated bowel. Recommend further assessment with CT abdomen and pelvis.   2.  Bidirectional flow in the main portal vein which is suspicious for portal hypertension.   3.  Collateral veins at the liver and spleen.   4.  The liver is diffusely increased in echogenicity.  This is nonspecific though can be seen with fatty infiltration or other hepatocellular disease.      MR-ABDOMEN-WITH & W/O    (Results Pending)        Assessment/Plan  * Pancreatitis, pancreatic cyst- (present on admission)  Assessment & Plan  CT of the abdomen pelvis with contrast showed evidence of pancreatitis, as well as complex cystic structure requiring further evaluation with MRI of the brain abdomen with contrast  Since biliary ducts dilated, will order MRI with and without contrast to evaluate biliary duct and  pancreatic cyst  Admitted for pain control rehydration  IV fluid support  Pepcid, Zofran as needed    Await MRCP    Memory deficit  Assessment & Plan  Possibly component of wernicke given history of severe alcohol abuse    Encephalopathy  Assessment & Plan  Toxic/metabolic.  Appears to have short term memory loss  Will check alcohol level, UDS, will give thiamine supplementation    Hypokalemia  Assessment & Plan  Monitor and repalce    Hyperglycemia  Assessment & Plan  Glucose 145.  Possibly developing diabetes due to chronic pancreatitis  A1c 4.8    Thrombocytopenia (HCC)  Assessment & Plan  Plt 130  Likely due to history of alcohol abuse         VTE prophylaxis:   SCDs/TEDs      I have performed a physical exam and reviewed and updated ROS and Plan today (5/20/2024). In review of yesterday's note (5/19/2024), there are no changes except as documented above.    Greater than 51 minutes spent prepping to see patient (e.g. review of tests) obtaining and/or reviewing separately obtained history. Performing a medically appropriate examination and/ evaluation.  Counseling and educating the patient/family/caregiver.  Ordering medications, tests, or procedures.  Referring and communicating with other health care professionals.  Documenting clinical information in EPIC.  Independently interpreting results and communicating results to patient/family/caregiver.  Care coordination.

## 2024-05-20 NOTE — PROGRESS NOTES
4 Eyes Skin Assessment Completed by TALYA Block and TALYA Peralta.    Head WDL  Ears WDL  Nose WDL  Mouth WDL  Neck WDL  Breast/Chest WDL  Shoulder Blades WDL  Spine WDL  (R) Arm/Elbow/Hand WDL  (L) Arm/Elbow/Hand WDL  Abdomen WDL  Groin WDL  Scrotum/Coccyx/Buttocks WDL  (R) Leg WDL  (L) Leg WDL  (R) Heel/Foot/Toe WDL  (L) Heel/Foot/Toe WDL          Devices In Places Blood Pressure Cuff      Interventions In Place Pillows    Possible Skin Injury No    Pictures Uploaded Into Epic N/A  Wound Consult Placed N/A  RN Wound Prevention Protocol Ordered No

## 2024-05-20 NOTE — ED NOTES
Bedside report received from off going RN: Evon, assumed care of patient.  POC discussed with patient. Call light within reach, all needs addressed at this time.       Fall risk interventions in place: Not Applicable (all applicable per [place] Fall risk assessment)   Continuous monitoring: Pulse oximeter, auto BP cuff.  IVF/IV medications: Not Applicable   Oxygen: Room Air  Bedside sitter: Not Applicable   Isolation: Not Applicable

## 2024-05-20 NOTE — ASSESSMENT & PLAN NOTE
Significant short term memory loss. Unable to communicate where she lives; become lost in the hallways; unable to communicate regarding friends/family in the area.   Possible secondary to Wernicke given history of alcohol abuse     - MRI brain largely within normal limits  - EEG diffuse slowing, epileptiform or seizure activity   - No improvement with trial of lactulose-  discontinue at this time  - Continue IV thiamine Day 3/3 today; completed   - Discussed with neurology- JAMES Rehman  - LP ordered for today

## 2024-05-20 NOTE — ED PROVIDER NOTES
8:30 PM I assumed care of the patient from Dr. Multani to follow-up on CT scan results  2110 PM I spoke to radiologist Dr Wheatley about CT results, patient with some ongoing pain discussed admission vs discharge with the patient, will plan to admit  2220 PM spoke to Dr Pepe who accepted admission

## 2024-05-20 NOTE — HOSPITAL COURSE
Belinda Rodriguez is a 29 yr old female with a PMHx of endometriosis, depression and alcohol abuse. Admitted 5/19 for abdominal pain.     CT A/P:  Large complex multiloculated cystic structure in the pancreas, could represent large pseudocyst, however other cystic pancreatic lesion is not definitively excluded.     MRCP:   Large multiloculated fluid collection replacing the pancreatic parenchyma. This could be related to prior pancreatitis or walled-off necrosis. Discussed with GI. Plan to continue with conservative management at this time.  If patient develops increasing white count or worsening pain she may need surgical intervention in the future.    During hospitalization it became quite apparent that patient has significant memory impairment.  MRI brain was largely within normal limits.  Neurology was consulted.  Recommending a lumbar puncture-which was unremarkable.  Autoimmune panel is still pending.  EEG was within normal limits.  Ultimately, it appears that her long-term and heavy levels of alcohol consumption have significantly impacted her memory and her recall.  Her long-term memory is intact however, her short-term memory is severely diminished.  Patient will require close outpatient monitoring and likely lifelong support and caretaking to help with her memory loss. ACP time spent: 22 minutes    I have discussed this with patient's father, Lopez.  He understands that she is safe for discharge home at this time.  However, he will work with her sister and family to come up with a long-term solution to support patient. They are working on Medicaid application and disability applications at this time.  Referral for outpatient neurology has been placed.    Iron has been started for iron deficiency anemia.  Patient is discharged home on a multivitamin and thiamine.  I have placed referrals to establish with primary care provider.

## 2024-05-21 LAB
ALBUMIN SERPL BCP-MCNC: 3.3 G/DL (ref 3.2–4.9)
ALBUMIN/GLOB SERPL: 0.9 G/DL
ALP SERPL-CCNC: 74 U/L (ref 30–99)
ALT SERPL-CCNC: 10 U/L (ref 2–50)
AMMONIA PLAS-SCNC: 36 UMOL/L (ref 11–45)
ANION GAP SERPL CALC-SCNC: 14 MMOL/L (ref 7–16)
AST SERPL-CCNC: 16 U/L (ref 12–45)
BASOPHILS # BLD AUTO: 0.3 % (ref 0–1.8)
BASOPHILS # BLD: 0.02 K/UL (ref 0–0.12)
BILIRUB SERPL-MCNC: 0.7 MG/DL (ref 0.1–1.5)
BUN SERPL-MCNC: 5 MG/DL (ref 8–22)
CALCIUM ALBUM COR SERPL-MCNC: 9.2 MG/DL (ref 8.5–10.5)
CALCIUM SERPL-MCNC: 8.6 MG/DL (ref 8.5–10.5)
CHLORIDE SERPL-SCNC: 108 MMOL/L (ref 96–112)
CO2 SERPL-SCNC: 18 MMOL/L (ref 20–33)
CREAT SERPL-MCNC: 0.39 MG/DL (ref 0.5–1.4)
EOSINOPHIL # BLD AUTO: 0.05 K/UL (ref 0–0.51)
EOSINOPHIL NFR BLD: 0.8 % (ref 0–6.9)
ERYTHROCYTE [DISTWIDTH] IN BLOOD BY AUTOMATED COUNT: 49.8 FL (ref 35.9–50)
GFR SERPLBLD CREATININE-BSD FMLA CKD-EPI: 139 ML/MIN/1.73 M 2
GLOBULIN SER CALC-MCNC: 3.6 G/DL (ref 1.9–3.5)
GLUCOSE SERPL-MCNC: 93 MG/DL (ref 65–99)
HCT VFR BLD AUTO: 33.3 % (ref 37–47)
HGB BLD-MCNC: 11 G/DL (ref 12–16)
IMM GRANULOCYTES # BLD AUTO: 0.03 K/UL (ref 0–0.11)
IMM GRANULOCYTES NFR BLD AUTO: 0.5 % (ref 0–0.9)
INR PPP: 1.11 (ref 0.87–1.13)
LYMPHOCYTES # BLD AUTO: 1.49 K/UL (ref 1–4.8)
LYMPHOCYTES NFR BLD: 24.7 % (ref 22–41)
MAGNESIUM SERPL-MCNC: 2.1 MG/DL (ref 1.5–2.5)
MCH RBC QN AUTO: 28.6 PG (ref 27–33)
MCHC RBC AUTO-ENTMCNC: 33 G/DL (ref 32.2–35.5)
MCV RBC AUTO: 86.5 FL (ref 81.4–97.8)
MONOCYTES # BLD AUTO: 0.54 K/UL (ref 0–0.85)
MONOCYTES NFR BLD AUTO: 9 % (ref 0–13.4)
NEUTROPHILS # BLD AUTO: 3.9 K/UL (ref 1.82–7.42)
NEUTROPHILS NFR BLD: 64.7 % (ref 44–72)
NRBC # BLD AUTO: 0 K/UL
NRBC BLD-RTO: 0 /100 WBC (ref 0–0.2)
PHOSPHATE SERPL-MCNC: 2.9 MG/DL (ref 2.5–4.5)
PLATELET # BLD AUTO: 111 K/UL (ref 164–446)
PMV BLD AUTO: 9.4 FL (ref 9–12.9)
POTASSIUM SERPL-SCNC: 3.9 MMOL/L (ref 3.6–5.5)
PROT SERPL-MCNC: 6.9 G/DL (ref 6–8.2)
PROTHROMBIN TIME: 14.4 SEC (ref 12–14.6)
RBC # BLD AUTO: 3.85 M/UL (ref 4.2–5.4)
SODIUM SERPL-SCNC: 140 MMOL/L (ref 135–145)
WBC # BLD AUTO: 6 K/UL (ref 4.8–10.8)

## 2024-05-21 PROCEDURE — 99232 SBSQ HOSP IP/OBS MODERATE 35: CPT | Performed by: STUDENT IN AN ORGANIZED HEALTH CARE EDUCATION/TRAINING PROGRAM

## 2024-05-21 PROCEDURE — 99254 IP/OBS CNSLTJ NEW/EST MOD 60: CPT | Performed by: NURSE PRACTITIONER

## 2024-05-21 RX ORDER — LACTULOSE 20 G/30ML
30 SOLUTION ORAL 3 TIMES DAILY
Status: DISCONTINUED | OUTPATIENT
Start: 2024-05-21 | End: 2024-05-23

## 2024-05-21 RX ORDER — LORAZEPAM 0.5 MG/1
0.5 TABLET ORAL
Status: DISCONTINUED | OUTPATIENT
Start: 2024-05-21 | End: 2024-05-25 | Stop reason: HOSPADM

## 2024-05-21 RX ADMIN — FAMOTIDINE 20 MG: 20 TABLET, FILM COATED ORAL at 05:40

## 2024-05-21 RX ADMIN — OXYCODONE 5 MG: 5 TABLET ORAL at 05:40

## 2024-05-21 RX ADMIN — FAMOTIDINE 20 MG: 20 TABLET, FILM COATED ORAL at 17:19

## 2024-05-21 RX ADMIN — SODIUM CHLORIDE: 9 INJECTION, SOLUTION INTRAVENOUS at 17:21

## 2024-05-21 RX ADMIN — SENNOSIDES AND DOCUSATE SODIUM 2 TABLET: 50; 8.6 TABLET ORAL at 17:19

## 2024-05-21 RX ADMIN — LACTULOSE 30 ML: 20 SOLUTION ORAL at 17:19

## 2024-05-21 RX ADMIN — ENOXAPARIN SODIUM 40 MG: 100 INJECTION SUBCUTANEOUS at 17:19

## 2024-05-21 RX ADMIN — OXYCODONE 5 MG: 5 TABLET ORAL at 23:45

## 2024-05-21 RX ADMIN — Medication 100 MG: at 05:40

## 2024-05-21 ASSESSMENT — ENCOUNTER SYMPTOMS
SHORTNESS OF BREATH: 0
ABDOMINAL PAIN: 0
FEVER: 0
VOMITING: 0
NAUSEA: 0

## 2024-05-21 ASSESSMENT — PAIN DESCRIPTION - PAIN TYPE
TYPE: ACUTE PAIN

## 2024-05-21 ASSESSMENT — PAIN SCALES - PAIN ASSESSMENT IN ADVANCED DEMENTIA (PAINAD)
BODYLANGUAGE: RELAXED
CONSOLABILITY: NO NEED TO CONSOLE
TOTALSCORE: 0
BREATHING: NORMAL
FACIALEXPRESSION: SMILING OR INEXPRESSIVE

## 2024-05-21 ASSESSMENT — PAIN SCALES - WONG BAKER: WONGBAKER_NUMERICALRESPONSE: HURTS JUST A LITTLE BIT

## 2024-05-21 NOTE — CARE PLAN
The patient is Stable - Low risk of patient condition declining or worsening    Shift Goals  Clinical Goals: no pain no nausea; tolerate diet; establish IV access; advance diet;re-orient  Patient Goals: eat food  Family Goals: n/a at this time    NOTE: pt remains A&Ox1-2, needing reorientation and reminded on POC. Pt forgetful and repetitive with questions. Pt reports <3 pain/tenderness throughout shift. Pt reports no nausea and is tolerating her advanced diet. Pt remained free from falls.    Progress made toward(s) clinical / shift goals:  progressing    Problem: Pain - Standard  Goal: Alleviation of pain or a reduction in pain to the patient’s comfort goal  Outcome: Progressing     Problem: Psychosocial  Goal: Patient's level of anxiety will decrease  Outcome: Progressing     Problem: Communication  Goal: The ability to communicate needs accurately and effectively will improve  Outcome: Progressing     Problem: Nutrition  Goal: Patient's nutritional and fluid intake will be adequate or improve  Outcome: Progressing     Problem: Gastrointestinal Irritability  Goal: Nausea and vomiting will be absent or improve  Outcome: Progressing       Patient is not progressing towards the following goals:      Problem: Knowledge Deficit - Standard  Goal: Patient and family/care givers will demonstrate understanding of plan of care, disease process/condition, diagnostic tests and medications  Outcome: Not Met

## 2024-05-21 NOTE — CONSULTS
"..GASTROENTEROLOGY CONSULTATION    PATIENT NAME: Belinda Rodriguez  : 1995  CSN: [id number]  MRN:  [id number]     CONSULTATION DATE:  2024    PRIMARY CARE PROVIDER:  Pcp Pt States None      REASON FOR CONSULT:  Pancreatic fluid collection    Consult requested by Dr. Viktoria Vargas    HISTORY OF PRESENT ILLNESS:  Belinda Rodriguez is a 29 y.o. female with history of alcohol misuse disorder, self-reported gluten and lactose intolerance, cigarette smoking, and depression who presented to the ED on 2024 with complaints of nausea, vomiting, and mid to lower abdominal pain for 2 days. It is difficult to get a solid history as patient is an extremely poor historian and could not tell me why she came to the hospital. She appears encephalopathic and tangential and repetitive in her answers. She reports that she has had a significant alcohol history and has princess drinking 1/2 of 750ml of whiskey every day for about 2 years, maybe more. She states she quit smoking 2 years ago. She doesn't work and can't remember if she has ever seen a gastroenterologist.     She states she has always had stomach issues. She currently complains of \"tingly\" type abdominal pain but doesn't know when it started. Abdomen is mildly distended and diffusely tender on exam. She thinks she is having regular bowel movements every other day and endorses decreased appetite.    Workup to date remarkable for hgb 11.0, platelets 111, lipase 94, Ca 125 37. CT on admission with large complex multiloculated cystic structure in the pancreas. MRCP concurs and relates the fluid could be related to prior pancreatitis or walled off necrosis. No solid or suspicious pancreatic component identified.     PAST MEDICAL HISTORY:  Past Medical History:   Diagnosis Date    Alcohol abuse     Depression        PAST SURGICAL HISTORY:  History reviewed. No pertinent surgical history.     CURRENT MEDS:  Current Facility-Administered Medications   Medication Dose Route " Frequency Provider Last Rate Last Admin    famotidine (Pepcid) tablet 20 mg  20 mg Oral BID Luis Ann D.O.   20 mg at 05/21/24 0540    NS infusion   Intravenous Continuous Luis Ann D.O.   Stopped at 05/21/24 0538    enoxaparin (Lovenox) inj 40 mg  40 mg Subcutaneous DAILY AT 1800 Efrem Segura M.D.   40 mg at 05/20/24 1822    labetalol (Normodyne/Trandate) injection 10 mg  10 mg Intravenous Q4HRS PRN Efrem Segura M.D.        senna-docusate (Pericolace Or Senokot S) 8.6-50 MG per tablet 2 Tablet  2 Tablet Oral Q EVENING Efrem Segura M.D.   2 Tablet at 05/20/24 1822    And    polyethylene glycol/lytes (Miralax) Packet 1 Packet  1 Packet Oral QDAY PRN Efrem Segura M.D.        acetaminophen (Tylenol) tablet 650 mg  650 mg Oral Q6HRS PRN Efrem Segura M.D.   650 mg at 05/20/24 2224    Pharmacy Consult Request ...Pain Management Review 1 Each  1 Each Other PHARMACY TO DOSE Efrem Segura M.D.        oxyCODONE immediate-release (Roxicodone) tablet 5 mg  5 mg Oral Q3HRS PRN Efrem Segura M.D.   5 mg at 05/21/24 0540    Or    oxyCODONE immediate release (Roxicodone) tablet 10 mg  10 mg Oral Q3HRS PRN Efrem Segura M.D.        Or    morphine 4 MG/ML injection 4 mg  4 mg Intravenous Q3HRS PRN Efrem Segura M.D.        ondansetron (Zofran) syringe/vial injection 4 mg  4 mg Intravenous Q4HRS PRN Efrem Segura M.D.        ondansetron (Zofran ODT) dispertab 4 mg  4 mg Oral Q4HRS PRN Efrem Segura M.D.        promethazine (Phenergan) tablet 12.5-25 mg  12.5-25 mg Oral Q4HRS PRN Efrem Segura M.D.        promethazine (Phenergan) suppository 12.5-25 mg  12.5-25 mg Rectal Q4HRS PRN Efrem Seguar M.D.        prochlorperazine (Compazine) injection 5-10 mg  5-10 mg Intravenous Q4HRS PRN Efrem Segura M.D.        LORazepam (Ativan) injection 0.5 mg  0.5 mg Intravenous Once PRN Efrem eSgura M.D.        thiamine (Vitamin B-1) tablet 100 mg  100 mg Oral DAILY Efrem  "YASMANY Segura   100 mg at 05/21/24 0540        ALLERGIES:  Allergies   Allergen Reactions    Peach [Prunus Persica] Itching       SOCIAL HISTORY:  Social History     Socioeconomic History    Marital status: Single     Spouse name: Not on file    Number of children: Not on file    Years of education: Not on file    Highest education level: Not on file   Occupational History    Not on file   Tobacco Use    Smoking status: Former     Types: Cigarettes    Smokeless tobacco: Not on file   Substance and Sexual Activity    Alcohol use: Not Currently     Comment: etoh abuse, last drink 4 months ago    Drug use: Not Currently     Comment: marijuana    Sexual activity: Not on file   Other Topics Concern    Not on file   Social History Narrative    Not on file     Social Determinants of Health     Financial Resource Strain: Not on file   Food Insecurity: Not on file   Transportation Needs: Not on file   Physical Activity: Not on file   Stress: Not on file   Social Connections: Not on file   Intimate Partner Violence: Not At Risk (5/19/2024)    Humiliation, Afraid, Rape, and Kick questionnaire     Fear of Current or Ex-Partner: No     Emotionally Abused: No     Physically Abused: No     Sexually Abused: No   Housing Stability: Not on file       FAMILY HISTORY:  History reviewed. No pertinent family history.     REVIEW OF SYSTEMS:  General ROS: Negative for - chills, fever, night sweats + weight loss.  HEENT ROS: Negative  Respiratory ROS: Negative for - cough or shortness of breath.  Cardiovascular ROS:  Negative for - chest pain or palpitations.  Gastrointestinal ROS: As per the history of present illness.  Genito-Urinary ROS: Negative  Musculoskeletal ROS: Negative.  Neurological ROS: + memory loss  Skin ROS: negative  Hematology ROS: negative  Endocrinology ROS: Negative      PHYSICAL EXAM:  VITALS: /60   Pulse 80   Temp 36.3 °C (97.4 °F) (Temporal)   Resp 17   Ht 1.575 m (5' 2\")   Wt 51 kg (112 lb 7 oz)   LMP " "04/19/2024   SpO2 99%   BMI 20.56 kg/m²   GEN:  Belinda Rodriguez is a 29 y.o. female in no acute distress.  HEENT: Mucous membranes pink and moist.  Sclera anicteric.    NECK:    Neck supple without lymphadenopathy or thyromegaly.  LUNGS: Clear to auscultation posteriorly.  HEART: Regular rate and rhythm. S1 and S2 normal. No murmurs, gallops  ABD:  Slightly distended, diffusely tender   RECTAL: Not done at this time.  EXT:  Without cyanosis, deformity or pitting edema.  SKIN:  Pale, warm, dry.  NEURO: Encephalopathic with slow responses and tangential and repetitive thoughts    LABS:  Recent Labs     05/19/24  1705 05/20/24  0039 05/21/24  0136   WBC 9.9 8.9 6.0   MCV 85.9 87.7 86.5     Recent Labs     05/19/24  1705 05/20/24  0039 05/21/24  0136   GLUCOSE 145* 104* 93   BUN 11 8 5*   CO2 20 23 18*     No results found for: \"INR\", \"PT\"  No components found for: \"ALT\", \"AST\", \"GGT\", \"ALKPHOS\"  No results found for: \"BILINEO\"      @LASTIMGCAT([id number])@     @LASTIMGCAT([id number])@       IMPRESSION/PLAN:  Large multiloculated fluid collection replacing the pancreatic parenchyma, prior pancreatitis vs walled-off necrosis  Encephalopathy - suspicious that patient has been drinking heavily for years and may have Wernicke's   Depression  Thrombocytopenia  Slightly elevated     Plan:  OK to advance to lactose and gluten free diet  Will order INR to check liver function, AST/ALT/t. Bili WNL  Ammonia level  Will decide further plan of care based on discussion with Dr. Barrientos regarding MRCP imaging    Plan dicussed with patient, Dr. Vargas, Dr. Iliana Cristobal, DNP,  APRN  Gastroenterology      "

## 2024-05-21 NOTE — PROGRESS NOTES
Hospital Medicine Daily Progress Note    Date of Service  5/21/2024    Chief Complaint  Belinda Rodriguez is a 29 y.o. female admitted 5/19/2024 with abdominal pain.     Hospital Course  Belinda Rodriguez is a 29 yr old female with a PMHx of endometriosis, depression and alcohol abuse. Admitted 5/19 for abdominal pain.     CT A/P:  Large complex multiloculated cystic structure in the pancreas, could represent large pseudocyst, however other cystic pancreatic lesion is not definitively excluded.     MRCP:   Large multiloculated fluid collection replacing the pancreatic parenchyma. This could be related to prior pancreatitis or walled-off necrosis.     Discussed with GI. Plan to continue with conservative management at this time.     MRI brain and trial of lactulose is pending for further evaluation of memory loss.     Interval Problem Update  5/21: No acute overnight events. Patient is unable to answer questions about her health history, living situation, family/support system. MRI brain pending. GI consult today.     I have discussed this patient's plan of care and discharge plan at IDT rounds today with Case Management, Nursing, Nursing leadership, and other members of the IDT team.    Consultants/Specialty  GI    Code Status  Full Code    Disposition  The patient is not medically cleared for discharge to home or a post-acute facility.      I have placed the appropriate orders for post-discharge needs.    Review of Systems  Review of Systems   Constitutional:  Negative for fever.   Respiratory:  Negative for shortness of breath.    Cardiovascular:  Negative for chest pain.   Gastrointestinal:  Negative for abdominal pain, nausea and vomiting.        Physical Exam  Temp:  [36.3 °C (97.4 °F)-37.2 °C (98.9 °F)] 37.2 °C (98.9 °F)  Pulse:  [80-97] 97  Resp:  [16-18] 18  BP: ()/(48-83) 94/58  SpO2:  [93 %-100 %] 96 %    Physical Exam  Vitals and nursing note reviewed.   Constitutional:       General: She is not in  acute distress.     Appearance: Normal appearance. She is not ill-appearing.   HENT:      Head: Normocephalic.      Mouth/Throat:      Mouth: Mucous membranes are moist.      Pharynx: Oropharynx is clear.   Cardiovascular:      Rate and Rhythm: Normal rate and regular rhythm.      Heart sounds: Murmur heard.   Pulmonary:      Effort: Pulmonary effort is normal. No respiratory distress.      Breath sounds: Normal breath sounds. No wheezing.   Abdominal:      General: Abdomen is flat. Bowel sounds are normal. There is no distension.      Palpations: Abdomen is soft.      Tenderness: There is no abdominal tenderness.   Musculoskeletal:         General: No swelling. Normal range of motion.      Cervical back: Normal range of motion. No tenderness.   Skin:     General: Skin is warm and dry.   Neurological:      Mental Status: She is alert and oriented to person, place, and time.   Psychiatric:         Mood and Affect: Mood normal. Affect is flat.         Behavior: Behavior is cooperative.         Cognition and Memory: Cognition is impaired. Memory is impaired. She exhibits impaired recent memory and impaired remote memory.         Fluids    Intake/Output Summary (Last 24 hours) at 5/21/2024 1942  Last data filed at 5/21/2024 1431  Gross per 24 hour   Intake 1200 ml   Output --   Net 1200 ml       Laboratory  Recent Labs     05/19/24  1705 05/20/24  0039 05/21/24  0136   WBC 9.9 8.9 6.0   RBC 4.19* 4.38 3.85*   HEMOGLOBIN 12.1 12.5 11.0*   HEMATOCRIT 36.0* 38.4 33.3*   MCV 85.9 87.7 86.5   MCH 28.9 28.5 28.6   MCHC 33.6 32.6 33.0   RDW 49.4 50.8* 49.8   PLATELETCT 112* 130* 111*   MPV 9.6 9.6 9.4     Recent Labs     05/19/24  1705 05/20/24  0039 05/21/24  0136   SODIUM 136 137 140   POTASSIUM 3.5* 3.5* 3.9   CHLORIDE 101 100 108   CO2 20 23 18*   GLUCOSE 145* 104* 93   BUN 11 8 5*   CREATININE 0.49* 0.46* 0.39*   CALCIUM 9.2 9.0 8.6     Recent Labs     05/21/24  1125   INR 1.11               Imaging  MR-ABDOMEN-WITH & W/O    Final Result         1. There is a large multiloculated fluid collection replacing the pancreatic parenchyma. This could be related to prior pancreatitis or walled-off necrosis.      2. No solid or suspicious pancreatic component identified.      CT-ABDOMEN-PELVIS WITH   Final Result         1.  Large complex multiloculated cystic structure in the pancreas, could represent large pseudocyst, however other cystic pancreatic lesion is not definitively excluded. Recommend follow-up MRI of the pancreas with contrast for further characterization    as clinically appropriate.   2.  Slight peripancreatic fat stranding, consider component of pancreatitis.   3.  Mild intrahepatic biliary ductal dilatation   4.  Trace free fluid collection the pelvis      These findings were discussed with the patient's clinician, Brendan Multani, on 5/19/2024 9:02 PM.      US-RUQ   Final Result      1.  Tubular fluid-filled structure at the midline abdomen measuring up to 6 cm transverse. This may be aberrantly dilated bowel. Recommend further assessment with CT abdomen and pelvis.   2.  Bidirectional flow in the main portal vein which is suspicious for portal hypertension.   3.  Collateral veins at the liver and spleen.   4.  The liver is diffusely increased in echogenicity.  This is nonspecific though can be seen with fatty infiltration or other hepatocellular disease.      MR-BRAIN-WITH & W/O    (Results Pending)        Assessment/Plan  * Pancreatitis, pancreatic cyst- (present on admission)  Assessment & Plan  MRCP: large multiloculated fluid collection replacing the pancreatic parenchyma. This could be related to prior pancreatitis or walled-off necrosis.   - Discussed with GI   - Continue medical management at this time   - If increasing WBC, fevers, change in abdominal exam - consider drainage     Memory deficit  Assessment & Plan  Secondary to Wernicke vs other processes  - MRI brain pending     Encephalopathy  Assessment &  Plan  Significant short term memory loss. Unable to communicate where she lives; become lost in the hallways; unable to communicate regarding friends/family in the area.   Possible secondary to Wernicke given history of alcohol abuse     - MRI brain is pending  - Start trial of lactulose     Hypokalemia  Assessment & Plan  Monitor and repalce    Hyperglycemia  Assessment & Plan  A1c 4.8    Thrombocytopenia (HCC)  Assessment & Plan  Platelet 111  Secondary to alcohol abuse   - Monitor closely for signs of bleeding  - Repeat CBC in AM         VTE prophylaxis:    enoxaparin ppx      I have performed a physical exam and reviewed and updated ROS and Plan today (5/21/2024). In review of yesterday's note (5/20/2024), there are no changes except as documented above.

## 2024-05-21 NOTE — PROGRESS NOTES
Received RN report at bedside  AO x 2. Confused with time and situation/ conversation. Repetitive  New IV inserted as previous was infiltrated  NS x 125 cc/hr, flushing well  5/10 headache/abdominal pain. Tylenol given and helps  Ambulating in the room and hallway  Didn't sleep much. Only taking naps  Standby assist, frequent rounding  Room near nurses station  Reminded in the use of call light.  Kept safe and continue monitoring    0427: Patient is more confused and anxious. Pulled out her IV. Stating that somebody told her to pulled it. Education provided and patient still refusing to have another IV insertion at this time. Charge RN made aware.     MRI Abd - Large fluid collection replacing pancreatic parenchyma. Possible from prior pancreatis/ wall off necrosis

## 2024-05-21 NOTE — CARE PLAN
The patient is Stable - Low risk of patient condition declining or worsening    Shift Goals  Clinical Goals: pain 2/10 or less, IVF, sleeps more than 5 hours  Patient Goals: Pain 2/10 or less, comfort  Family Goals: not present    Progress made toward(s) clinical / shift goals:    Problem: Knowledge Deficit - Standard  Goal: Patient and family/care givers will demonstrate understanding of plan of care, disease process/condition, diagnostic tests and medications  Outcome: Progressing  Note: Reviewed plan of care. Understood and agreed.      Problem: Pain - Standard  Goal: Alleviation of pain or a reduction in pain to the patient’s comfort goal  Outcome: Progressing  Note: Education on pain medications and non pharmacologic interventions.      Received RN report at bedside  AO x 2. Confused with time and situation/ conversation. Repetitive  New IV inserted as previous was infiltrated  NS x 125 cc/hr, flushing well  5/10 headache/abdominal pain. Tylenol given and helps  Ambulating in the room and hallway  Didn't sleep much. Only taking naps  Standby assist, frequent rounding  Room near nurses station  Reminded in the use of call light.  Kept safe and continue monitoring    0427: Patient is more confused and anxious. Pulled out her IV. Stating that somebody told her to pulled it. Education provided and patient still refusing to have another IV insertion at this time. Charge RN made aware.     MRI Abd - Large fluid collection replacing pancreatic parenchyma. Possible from prior pancreatis/ wall off necrosis    Patient is not progressing towards the following goals:

## 2024-05-22 ENCOUNTER — APPOINTMENT (OUTPATIENT)
Dept: RADIOLOGY | Facility: MEDICAL CENTER | Age: 29
DRG: 438 | End: 2024-05-22
Attending: STUDENT IN AN ORGANIZED HEALTH CARE EDUCATION/TRAINING PROGRAM

## 2024-05-22 PROBLEM — Z71.89 ACP (ADVANCE CARE PLANNING): Status: ACTIVE | Noted: 2024-05-22

## 2024-05-22 LAB
ALBUMIN SERPL BCP-MCNC: 3.3 G/DL (ref 3.2–4.9)
ALBUMIN/GLOB SERPL: 1 G/DL
ALP SERPL-CCNC: 74 U/L (ref 30–99)
ALT SERPL-CCNC: 9 U/L (ref 2–50)
ANION GAP SERPL CALC-SCNC: 13 MMOL/L (ref 7–16)
AST SERPL-CCNC: 14 U/L (ref 12–45)
BILIRUB SERPL-MCNC: 0.5 MG/DL (ref 0.1–1.5)
BUN SERPL-MCNC: 4 MG/DL (ref 8–22)
CALCIUM ALBUM COR SERPL-MCNC: 9.3 MG/DL (ref 8.5–10.5)
CALCIUM SERPL-MCNC: 8.7 MG/DL (ref 8.5–10.5)
CHLORIDE SERPL-SCNC: 104 MMOL/L (ref 96–112)
CO2 SERPL-SCNC: 22 MMOL/L (ref 20–33)
CREAT SERPL-MCNC: 0.45 MG/DL (ref 0.5–1.4)
ERYTHROCYTE [DISTWIDTH] IN BLOOD BY AUTOMATED COUNT: 47.5 FL (ref 35.9–50)
GFR SERPLBLD CREATININE-BSD FMLA CKD-EPI: 134 ML/MIN/1.73 M 2
GLOBULIN SER CALC-MCNC: 3.4 G/DL (ref 1.9–3.5)
GLUCOSE SERPL-MCNC: 113 MG/DL (ref 65–99)
HCT VFR BLD AUTO: 30.1 % (ref 37–47)
HGB BLD-MCNC: 10.1 G/DL (ref 12–16)
MCH RBC QN AUTO: 28.5 PG (ref 27–33)
MCHC RBC AUTO-ENTMCNC: 33.6 G/DL (ref 32.2–35.5)
MCV RBC AUTO: 85 FL (ref 81.4–97.8)
PLATELET # BLD AUTO: 110 K/UL (ref 164–446)
PMV BLD AUTO: 9.3 FL (ref 9–12.9)
POTASSIUM SERPL-SCNC: 3.9 MMOL/L (ref 3.6–5.5)
PROT SERPL-MCNC: 6.7 G/DL (ref 6–8.2)
RBC # BLD AUTO: 3.54 M/UL (ref 4.2–5.4)
SODIUM SERPL-SCNC: 139 MMOL/L (ref 135–145)
WBC # BLD AUTO: 5.3 K/UL (ref 4.8–10.8)

## 2024-05-22 PROCEDURE — 99232 SBSQ HOSP IP/OBS MODERATE 35: CPT | Performed by: NURSE PRACTITIONER

## 2024-05-22 PROCEDURE — 99232 SBSQ HOSP IP/OBS MODERATE 35: CPT | Mod: 25 | Performed by: STUDENT IN AN ORGANIZED HEALTH CARE EDUCATION/TRAINING PROGRAM

## 2024-05-22 PROCEDURE — 99497 ADVNCD CARE PLAN 30 MIN: CPT | Performed by: STUDENT IN AN ORGANIZED HEALTH CARE EDUCATION/TRAINING PROGRAM

## 2024-05-22 RX ORDER — THIAMINE HYDROCHLORIDE 100 MG/ML
100 INJECTION, SOLUTION INTRAMUSCULAR; INTRAVENOUS DAILY
Status: COMPLETED | OUTPATIENT
Start: 2024-05-22 | End: 2024-05-24

## 2024-05-22 RX ADMIN — ENOXAPARIN SODIUM 40 MG: 100 INJECTION SUBCUTANEOUS at 17:35

## 2024-05-22 RX ADMIN — THIAMINE HYDROCHLORIDE 100 MG: 100 INJECTION, SOLUTION INTRAMUSCULAR; INTRAVENOUS at 08:07

## 2024-05-22 RX ADMIN — FAMOTIDINE 20 MG: 20 TABLET, FILM COATED ORAL at 05:26

## 2024-05-22 RX ADMIN — SENNOSIDES AND DOCUSATE SODIUM 2 TABLET: 50; 8.6 TABLET ORAL at 17:35

## 2024-05-22 RX ADMIN — OXYCODONE HYDROCHLORIDE 10 MG: 10 TABLET ORAL at 05:25

## 2024-05-22 RX ADMIN — LACTULOSE 30 ML: 20 SOLUTION ORAL at 12:56

## 2024-05-22 RX ADMIN — LACTULOSE 30 ML: 20 SOLUTION ORAL at 17:35

## 2024-05-22 RX ADMIN — FAMOTIDINE 20 MG: 20 TABLET, FILM COATED ORAL at 17:35

## 2024-05-22 RX ADMIN — Medication 100 MG: at 05:26

## 2024-05-22 RX ADMIN — LACTULOSE 30 ML: 20 SOLUTION ORAL at 05:25

## 2024-05-22 ASSESSMENT — ENCOUNTER SYMPTOMS
VOMITING: 0
CONSTIPATION: 0
BACK PAIN: 0
SHORTNESS OF BREATH: 0
DIZZINESS: 0
CHILLS: 0
MEMORY LOSS: 1
BLURRED VISION: 0
WEAKNESS: 0
HEARTBURN: 0
NAUSEA: 0
COUGH: 0
DEPRESSION: 0
FEVER: 0
BLOOD IN STOOL: 0
DIARRHEA: 0
ABDOMINAL PAIN: 0

## 2024-05-22 ASSESSMENT — PAIN SCALES - PAIN ASSESSMENT IN ADVANCED DEMENTIA (PAINAD)
BREATHING: NORMAL
BREATHING: NORMAL
CONSOLABILITY: NO NEED TO CONSOLE
BODYLANGUAGE: RELAXED
CONSOLABILITY: NO NEED TO CONSOLE
FACIALEXPRESSION: SAD, FRIGHTENED, FROWN
TOTALSCORE: 1
FACIALEXPRESSION: SMILING OR INEXPRESSIVE
TOTALSCORE: 0
BODYLANGUAGE: RELAXED

## 2024-05-22 ASSESSMENT — PAIN SCALES - WONG BAKER
WONGBAKER_NUMERICALRESPONSE: HURTS JUST A LITTLE BIT
WONGBAKER_NUMERICALRESPONSE: HURTS JUST A LITTLE BIT

## 2024-05-22 ASSESSMENT — PAIN DESCRIPTION - PAIN TYPE: TYPE: ACUTE PAIN

## 2024-05-22 NOTE — CARE PLAN
The patient is Stable - Low risk of patient condition declining or worsening    Shift Goals  Clinical Goals: free from fall, re-orient, sleeps more than 5 hours  Patient Goals: rest  Family Goals: not present    Progress made toward(s) clinical / shift goals:    Problem: Knowledge Deficit - Standard  Goal: Patient and family/care givers will demonstrate understanding of plan of care, disease process/condition, diagnostic tests and medications  Outcome: Progressing     Problem: Pain - Standard  Goal: Alleviation of pain or a reduction in pain to the patient’s comfort goal  Outcome: Progressing     Problem: Nutrition  Goal: Patient's nutritional and fluid intake will be adequate or improve  Outcome: Progressing     Received RN report at bedside  AO x 2. Confused with time and situation/ conversation. Repetitive  Iv site patent, IVF discontinued as ordered  7/10 back pain. Pain med given and drops to 4/10  Ambulating in the room and hallway  Sleeps better tonight  Standby assist, frequent rounding  Room near nurses station  Reminded in the use of call light.  Kept safe and continue monitor    Plan:   Pain mgt  Monitor Ammonia level- bowel movement    Patient is not progressing towards the following goals:

## 2024-05-22 NOTE — PROGRESS NOTES
"..Gastroenterology Progress Note               Author:  Nova Cristobal, SENA,  APRN Date & Time Created: 5/22/2024 7:27 AM       Patient ID:  Name:             Belinda Rodriguez    YOB: 1995  Age:                 29 y.o.  female  MRN:               [id number]        Medical Decision Making, by Problem:  Active Hospital Problems    Diagnosis     Memory deficit [R41.3]     Pancreatitis, pancreatic cyst [K85.90]     Thrombocytopenia (HCC) [D69.6]     Hyperglycemia [R73.9]     Hypokalemia [E87.6]     Encephalopathy [G93.40]      Presenting Chief Complaint:  Pancreatic fluid collection    HISTORY OF PRESENT ILLNESS:  Belinda Rodriguez is a 29 y.o. female with history of alcohol misuse disorder, self-reported gluten and lactose intolerance, cigarette smoking, and depression who presented to the ED on 5/19/2024 with complaints of nausea, vomiting, and mid to lower abdominal pain for 2 days. It is difficult to get a solid history as patient is an extremely poor historian and could not tell me why she came to the hospital. She appears encephalopathic and tangential and repetitive in her answers. She reports that she has had a significant alcohol history and has been drinking 1/2 of 750ml of whiskey every day for about 2 years, maybe more. She states she quit smoking 2 years ago. She doesn't work and can't remember if she has ever seen a gastroenterologist.     She states she has always had stomach issues. She currently complains of \"tingly\" type abdominal pain but doesn't know when it started. Abdomen is mildly distended and diffusely tender on exam. She thinks she is having regular bowel movements every other day and endorses decreased appetite.     Workup to date remarkable for hgb 11.0, platelets 111, lipase 94, Ca 125 37. CT on admission with large complex multiloculated cystic structure in the pancreas. MRCP concurs and relates the fluid could be related to prior pancreatitis or walled off necrosis. No " [FreeTextEntry1] : A 4 months old with hypercalcemia as was describe. New onset of bilateral symmetrical startle like events.. On exam mildly hypotonic but otherwise normal \par Her REEG today was normal \par  solid or suspicious pancreatic component identified.     Interval History:  5/22/2024: Patient seen after MRI today to evaluate memory loss, read pending. Complaining of diffuse abdominal tenderness. No leukocytosis, Hgb 10.1. very tearful and says she's scared and worried that her father and sister are mad at her.    Hospital Medications:  Current Facility-Administered Medications   Medication Dose Frequency Provider Last Rate Last Admin    LORazepam (Ativan) tablet 0.5 mg  0.5 mg Once PRN Viktoria Vargas M.D.        lactulose 20 GM/30ML solution 30 mL  30 mL TID Viktoria Vargas M.D.   30 mL at 05/22/24 0525    famotidine (Pepcid) tablet 20 mg  20 mg BID Luis Ann D.OYari   20 mg at 05/22/24 0526    enoxaparin (Lovenox) inj 40 mg  40 mg DAILY AT 1800 Efrem Segura M.D.   40 mg at 05/21/24 1719    labetalol (Normodyne/Trandate) injection 10 mg  10 mg Q4HRS PRN Efrem Segura M.D.        senna-docusate (Pericolace Or Senokot S) 8.6-50 MG per tablet 2 Tablet  2 Tablet Q EVENING Efrem Segura M.D.   2 Tablet at 05/21/24 1719    And    polyethylene glycol/lytes (Miralax) Packet 1 Packet  1 Packet QDAY PRN Efrem Segura M.D.        acetaminophen (Tylenol) tablet 650 mg  650 mg Q6HRS PRN Efrem Segura M.D.   650 mg at 05/20/24 2224    Pharmacy Consult Request ...Pain Management Review 1 Each  1 Each PHARMACY TO DOSE Efrem Segura M.D.        oxyCODONE immediate-release (Roxicodone) tablet 5 mg  5 mg Q3HRS PRN Efrem Segura M.D.   5 mg at 05/21/24 2345    Or    oxyCODONE immediate release (Roxicodone) tablet 10 mg  10 mg Q3HRS PRN Efrem Segura M.D.   10 mg at 05/22/24 0525    Or    morphine 4 MG/ML injection 4 mg  4 mg Q3HRS PRN Efrem Segura M.D.        ondansetron (Zofran) syringe/vial injection 4 mg  4 mg Q4HRS PRN Efrem Segura M.D.        ondansetron (Zofran ODT) dispertab 4 mg  4 mg Q4HRS PRN Efrem Segura M.D.        promethazine (Phenergan) tablet 12.5-25 mg   "12.5-25 mg Q4HRS PRN Efrem Segura M.D.        promethazine (Phenergan) suppository 12.5-25 mg  12.5-25 mg Q4HRS PRN Efrem Segura M.D.        prochlorperazine (Compazine) injection 5-10 mg  5-10 mg Q4HRS PRN Efrem Segura M.D.        thiamine (Vitamin B-1) tablet 100 mg  100 mg DAILY Efrem Segura M.D.   100 mg at 05/22/24 0526   Last reviewed on 5/19/2024  9:41 PM by Aly Cruz       Review of Systems:  Review of Systems   Constitutional:  Negative for chills, fever and malaise/fatigue.   HENT:  Negative for hearing loss.    Eyes:  Negative for blurred vision.   Respiratory:  Negative for cough and shortness of breath.    Cardiovascular:  Negative for chest pain and leg swelling.   Gastrointestinal:  Negative for abdominal pain, blood in stool, constipation, diarrhea, heartburn, melena, nausea and vomiting.   Genitourinary:  Negative for dysuria.   Musculoskeletal:  Negative for back pain.   Skin:  Negative for rash.   Neurological:  Negative for dizziness and weakness.   Psychiatric/Behavioral:  Positive for memory loss. Negative for depression.    All other systems reviewed and are negative.        Vital signs:  Weight/BMI: Body mass index is 20.56 kg/m².  /63   Pulse 80   Temp 36.3 °C (97.4 °F) (Temporal)   Resp 16   Ht 1.575 m (5' 2\")   Wt 51 kg (112 lb 7 oz)   SpO2 100%   Vitals:    05/21/24 1528 05/21/24 1909 05/22/24 0408 05/22/24 0718   BP: 98/48 94/58 100/61 105/63   Pulse: 86 97 73 80   Resp: 16 18 17 16   Temp: 36.7 °C (98 °F) 37.2 °C (98.9 °F) 36.4 °C (97.6 °F) 36.3 °C (97.4 °F)   TempSrc: Temporal Temporal Temporal Temporal   SpO2: 100% 96% 97% 100%   Weight:       Height:         Oxygen Therapy:  Pulse Oximetry: 100 %, O2 (LPM): 0, O2 Delivery Device: None - Room Air    Intake/Output Summary (Last 24 hours) at 5/22/2024 0727  Last data filed at 5/21/2024 2200  Gross per 24 hour   Intake 820 ml   Output --   Net 820 ml         Physical Exam:  Physical Exam  Vitals " and nursing note reviewed.   Constitutional:       General: She is not in acute distress.     Appearance: Normal appearance.   HENT:      Head: Normocephalic and atraumatic.      Right Ear: External ear normal.      Left Ear: External ear normal.      Nose: Nose normal.      Mouth/Throat:      Mouth: Mucous membranes are moist.      Pharynx: Oropharynx is clear.   Eyes:      General: No scleral icterus.  Cardiovascular:      Rate and Rhythm: Normal rate and regular rhythm.      Pulses: Normal pulses.      Heart sounds: Normal heart sounds.   Pulmonary:      Effort: Pulmonary effort is normal. No respiratory distress.      Breath sounds: Normal breath sounds.   Abdominal:      General: Abdomen is flat. Bowel sounds are normal. There is no distension.      Palpations: Abdomen is soft.      Tenderness: There is abdominal tenderness.   Musculoskeletal:         General: Normal range of motion.      Cervical back: Normal range of motion.   Skin:     General: Skin is warm and dry.      Capillary Refill: Capillary refill takes less than 2 seconds.      Coloration: Skin is pale.   Neurological:      Mental Status: She is alert. She is disoriented.   Psychiatric:      Comments: Tearful              Labs:  Recent Labs     05/20/24 0039 05/21/24 0136 05/22/24 0422   SODIUM 137 140 139   POTASSIUM 3.5* 3.9 3.9   CHLORIDE 100 108 104   CO2 23 18* 22   BUN 8 5* 4*   CREATININE 0.46* 0.39* 0.45*   MAGNESIUM  --  2.1  --    PHOSPHORUS  --  2.9  --    CALCIUM 9.0 8.6 8.7     Recent Labs     05/19/24 1705 05/20/24 0039 05/21/24 0136 05/22/24 0422   ALTSGPT 11 11 10 9   ASTSGOT 17 16 16 14   ALKPHOSPHAT 79 80 74 74   TBILIRUBIN 1.0 0.9 0.7 0.5   LIPASE 94*  --   --   --    GLUCOSE 145* 104* 93 113*     Recent Labs     05/19/24 1705 05/20/24 0039 05/21/24 0136 05/22/24 0422   WBC 9.9 8.9 6.0 5.3   NEUTSPOLYS 83.00* 70.70 64.70  --    LYMPHOCYTES 10.20* 21.20* 24.70  --    MONOCYTES 6.10 6.60 9.00  --    EOSINOPHILS 0.20 0.70  0.80  --    BASOPHILS 0.20 0.20 0.30  --    ASTSGOT 17 16 16 14   ALTSGPT 11 11 10 9   ALKPHOSPHAT 79 80 74 74   TBILIRUBIN 1.0 0.9 0.7 0.5     Recent Labs     05/20/24  0039 05/21/24  0136 05/21/24  1125 05/22/24  0422   RBC 4.38 3.85*  --  3.54*   HEMOGLOBIN 12.5 11.0*  --  10.1*   HEMATOCRIT 38.4 33.3*  --  30.1*   PLATELETCT 130* 111*  --  110*   PROTHROMBTM  --   --  14.4  --    INR  --   --  1.11  --      Recent Results (from the past 24 hour(s))   Prothrombin Time    Collection Time: 05/21/24 11:25 AM   Result Value Ref Range    PT 14.4 12.0 - 14.6 sec    INR 1.11 0.87 - 1.13   AMMONIA    Collection Time: 05/21/24 11:25 AM   Result Value Ref Range    Ammonia 36 11 - 45 umol/L   CBC WITHOUT DIFFERENTIAL    Collection Time: 05/22/24  4:22 AM   Result Value Ref Range    WBC 5.3 4.8 - 10.8 K/uL    RBC 3.54 (L) 4.20 - 5.40 M/uL    Hemoglobin 10.1 (L) 12.0 - 16.0 g/dL    Hematocrit 30.1 (L) 37.0 - 47.0 %    MCV 85.0 81.4 - 97.8 fL    MCH 28.5 27.0 - 33.0 pg    MCHC 33.6 32.2 - 35.5 g/dL    RDW 47.5 35.9 - 50.0 fL    Platelet Count 110 (L) 164 - 446 K/uL    MPV 9.3 9.0 - 12.9 fL   Comp Metabolic Panel    Collection Time: 05/22/24  4:22 AM   Result Value Ref Range    Sodium 139 135 - 145 mmol/L    Potassium 3.9 3.6 - 5.5 mmol/L    Chloride 104 96 - 112 mmol/L    Co2 22 20 - 33 mmol/L    Anion Gap 13.0 7.0 - 16.0    Glucose 113 (H) 65 - 99 mg/dL    Bun 4 (L) 8 - 22 mg/dL    Creatinine 0.45 (L) 0.50 - 1.40 mg/dL    Calcium 8.7 8.5 - 10.5 mg/dL    Correct Calcium 9.3 8.5 - 10.5 mg/dL    AST(SGOT) 14 12 - 45 U/L    ALT(SGPT) 9 2 - 50 U/L    Alkaline Phosphatase 74 30 - 99 U/L    Total Bilirubin 0.5 0.1 - 1.5 mg/dL    Albumin 3.3 3.2 - 4.9 g/dL    Total Protein 6.7 6.0 - 8.2 g/dL    Globulin 3.4 1.9 - 3.5 g/dL    A-G Ratio 1.0 g/dL   ESTIMATED GFR    Collection Time: 05/22/24  4:22 AM   Result Value Ref Range    GFR (CKD-EPI) 134 >60 mL/min/1.73 m 2       Radiology Review:  MR-ABDOMEN-WITH & W/O   Final Result         1.  There is a large multiloculated fluid collection replacing the pancreatic parenchyma. This could be related to prior pancreatitis or walled-off necrosis.      2. No solid or suspicious pancreatic component identified.      CT-ABDOMEN-PELVIS WITH   Final Result         1.  Large complex multiloculated cystic structure in the pancreas, could represent large pseudocyst, however other cystic pancreatic lesion is not definitively excluded. Recommend follow-up MRI of the pancreas with contrast for further characterization    as clinically appropriate.   2.  Slight peripancreatic fat stranding, consider component of pancreatitis.   3.  Mild intrahepatic biliary ductal dilatation   4.  Trace free fluid collection the pelvis      These findings were discussed with the patient's clinician, Brendan Multani, on 5/19/2024 9:02 PM.      US-RUQ   Final Result      1.  Tubular fluid-filled structure at the midline abdomen measuring up to 6 cm transverse. This may be aberrantly dilated bowel. Recommend further assessment with CT abdomen and pelvis.   2.  Bidirectional flow in the main portal vein which is suspicious for portal hypertension.   3.  Collateral veins at the liver and spleen.   4.  The liver is diffusely increased in echogenicity.  This is nonspecific though can be seen with fatty infiltration or other hepatocellular disease.      MR-BRAIN-WITH & W/O    (Results Pending)       MDM (Data Review):   -Records reviewed and summarized in current documentation  -I personally reviewed and interpreted the laboratory results  -I personally reviewed the radiology images    Assessment/Recommendations:  Large multiloculated fluid collection replacing the pancreatic parenchyma, prior pancreatitis vs walled-off necrosis  Encephalopathy - suspicious that patient has been drinking heavily for years and may have Wernicke's   Depression  Thrombocytopenia  Slightly elevated     Recommendations:  Conservative management given  encephalopathy and unclear ability to consent to treatment, would need significant and close follow up   If she develops leukocytosis, increasing pain, or signs of worsening pancreatitis would recommend cyst gastrostomy or IR  Primary team dicussed with her father   Suspect an element of malnourishment given amount of hair loss and general frailty  Recommend iron, Vit A, B, D studies  Started on IV thiamine  Will need to follow with a PCP/GI team for serial labs and imaging given about 50% of pancreatic fluid collections will self-resolve  ?psych vs neuro consult    Plan discussed with patient, RN, Dr. Vargas, Dr. Arboleda    Core Quality Measures   Reviewed items::  Labs, Medications and Radiology reports reviewed

## 2024-05-22 NOTE — PROGRESS NOTES
Hospital Medicine Daily Progress Note    Date of Service  5/22/2024    Chief Complaint  Belinda Rodriguez is a 29 y.o. female admitted 5/19/2024 with abdominal pain.     Hospital Course  Belinda Rodriguez is a 29 yr old female with a PMHx of endometriosis, depression and alcohol abuse. Admitted 5/19 for abdominal pain.     CT A/P:  Large complex multiloculated cystic structure in the pancreas, could represent large pseudocyst, however other cystic pancreatic lesion is not definitively excluded.     MRCP:   Large multiloculated fluid collection replacing the pancreatic parenchyma. This could be related to prior pancreatitis or walled-off necrosis.     Discussed with GI. Plan to continue with conservative management at this time.     MRI brain and trial of lactulose is pending for further evaluation of memory loss.     Interval Problem Update  5/21: No acute overnight events. Patient is unable to answer questions about her health history, living situation, family/support system. MRI brain pending. GI consult today.     5/22: Some improvement in mentation overnight. Patient understands she is in the hospital. She states she has been drinking heavily for an unknown amount of time. She does not remember when she drank last. She thinks she lives with her dad, but is unsure. MRI brain is pending. High dose thiamine started today.      I have discussed this patient's plan of care and discharge plan at IDT rounds today with Case Management, Nursing, Nursing leadership, and other members of the IDT team.    Consultants/Specialty  GI    Code Status  Full Code    Disposition  The patient is not medically cleared for discharge to home or a post-acute facility.  Anticipate discharge to: home with close outpatient follow-up    I have placed the appropriate orders for post-discharge needs.    Review of Systems  Review of Systems   Constitutional:  Negative for fever.   Respiratory:  Negative for shortness of breath.    Cardiovascular:   Negative for chest pain.   Gastrointestinal:  Negative for abdominal pain, nausea and vomiting.        Physical Exam  Temp:  [36.3 °C (97.4 °F)-37.2 °C (98.9 °F)] 36.3 °C (97.4 °F)  Pulse:  [73-97] 80  Resp:  [16-18] 16  BP: ()/(48-63) 105/63  SpO2:  [96 %-100 %] 100 %    Physical Exam  Vitals and nursing note reviewed.   Constitutional:       General: She is not in acute distress.     Appearance: Normal appearance. She is not ill-appearing.   Cardiovascular:      Rate and Rhythm: Normal rate and regular rhythm.      Heart sounds: Murmur heard.   Pulmonary:      Effort: Pulmonary effort is normal. No respiratory distress.      Breath sounds: Normal breath sounds. No wheezing.   Abdominal:      General: Abdomen is flat. Bowel sounds are normal. There is no distension.      Palpations: Abdomen is soft.      Tenderness: There is no abdominal tenderness.   Skin:     General: Skin is warm and dry.   Neurological:      Mental Status: She is alert and oriented to person, place, and time.   Psychiatric:         Mood and Affect: Mood normal. Affect is flat.         Behavior: Behavior is cooperative.         Cognition and Memory: Cognition is impaired. Memory is impaired. She exhibits impaired recent memory and impaired remote memory.         Fluids    Intake/Output Summary (Last 24 hours) at 5/22/2024 1359  Last data filed at 5/21/2024 2200  Gross per 24 hour   Intake 460 ml   Output --   Net 460 ml       Laboratory  Recent Labs     05/20/24  0039 05/21/24  0136 05/22/24  0422   WBC 8.9 6.0 5.3   RBC 4.38 3.85* 3.54*   HEMOGLOBIN 12.5 11.0* 10.1*   HEMATOCRIT 38.4 33.3* 30.1*   MCV 87.7 86.5 85.0   MCH 28.5 28.6 28.5   MCHC 32.6 33.0 33.6   RDW 50.8* 49.8 47.5   PLATELETCT 130* 111* 110*   MPV 9.6 9.4 9.3     Recent Labs     05/20/24  0039 05/21/24  0136 05/22/24  0422   SODIUM 137 140 139   POTASSIUM 3.5* 3.9 3.9   CHLORIDE 100 108 104   CO2 23 18* 22   GLUCOSE 104* 93 113*   BUN 8 5* 4*   CREATININE 0.46* 0.39* 0.45*    CALCIUM 9.0 8.6 8.7     Recent Labs     05/21/24  1125   INR 1.11               Imaging  MR-ABDOMEN-WITH & W/O   Final Result         1. There is a large multiloculated fluid collection replacing the pancreatic parenchyma. This could be related to prior pancreatitis or walled-off necrosis.      2. No solid or suspicious pancreatic component identified.      CT-ABDOMEN-PELVIS WITH   Final Result         1.  Large complex multiloculated cystic structure in the pancreas, could represent large pseudocyst, however other cystic pancreatic lesion is not definitively excluded. Recommend follow-up MRI of the pancreas with contrast for further characterization    as clinically appropriate.   2.  Slight peripancreatic fat stranding, consider component of pancreatitis.   3.  Mild intrahepatic biliary ductal dilatation   4.  Trace free fluid collection the pelvis      These findings were discussed with the patient's clinician, Brendan Multani, on 5/19/2024 9:02 PM.      US-RUQ   Final Result      1.  Tubular fluid-filled structure at the midline abdomen measuring up to 6 cm transverse. This may be aberrantly dilated bowel. Recommend further assessment with CT abdomen and pelvis.   2.  Bidirectional flow in the main portal vein which is suspicious for portal hypertension.   3.  Collateral veins at the liver and spleen.   4.  The liver is diffusely increased in echogenicity.  This is nonspecific though can be seen with fatty infiltration or other hepatocellular disease.      MR-BRAIN-WITH & W/O    (Results Pending)        Assessment/Plan  * Pancreatitis, pancreatic cyst- (present on admission)  Assessment & Plan  MRCP: large multiloculated fluid collection replacing the pancreatic parenchyma. This could be related to prior pancreatitis or walled-off necrosis.   - Discussed with GI today; continue with non operative management   - Continue medical management at this time   - If increasing WBC, fevers, change in abdominal exam -  consider drainage     ACP (advance care planning)  Assessment & Plan  Addendum: long conversation with patient's father-Lopez.  He states that patient has been away from [place] for the past 6 years.  She moved back to [place] 2 months ago.  She has been living with her father for the last month.  Approximately 2 to 3 months ago she was hospitalized in [place] for 3 weeks.  She underwent alcohol withdrawal during the hospitalization.  Father notes worsening memory concerns over the last year.  He was told that the doctors in [place] could not identify an etiology for her memory changes.  Her father states that since moving to [place]-he would tell her where they live and a few minutes later she would ask again because she had already forgotten.  She does not have insurance so she has not established with a therapist or physician in [place].  ACP time spent 22 minutes.    Memory deficit  Assessment & Plan  Secondary to Wernicke vs other processes  - MRI brain pending     Encephalopathy  Assessment & Plan  Significant short term memory loss. Unable to communicate where she lives; become lost in the hallways; unable to communicate regarding friends/family in the area.   Possible secondary to Wernicke given history of alcohol abuse     - MRI brain is pending  - Continue trial of lactulose  - Start IV thiamine Day 1/3 today    Hypokalemia  Assessment & Plan  Monitor and repalce    Hyperglycemia  Assessment & Plan  A1c 4.8    Thrombocytopenia (HCC)  Assessment & Plan  Platelet 111  Secondary to alcohol abuse   - Monitor closely for signs of bleeding  - Repeat CBC in AM         VTE prophylaxis:   SCDs/TEDs   enoxaparin ppx      I have performed a physical exam and reviewed and updated ROS and Plan today (5/22/2024). In review of yesterday's note (5/21/2024), there are no changes except as documented above.

## 2024-05-22 NOTE — CARE PLAN
Problem: Knowledge Deficit - Standard  Goal: Patient and family/care givers will demonstrate understanding of plan of care, disease process/condition, diagnostic tests and medications  Outcome: Progressing  Note: Patient arrived to the hospital very confused, oriented to just self is what I was reported. Patient is oriented t oself, place, and year (the year only at times) during this shift. Aidan is directable but needs constant reinforcement as she is very forgetful     Problem: Communication  Goal: The ability to communicate needs accurately and effectively will improve  Outcome: Progressing  Note: Patient has been able to adequately communicate needs with staff this shift   The patient is Stable - Low risk of patient condition declining or worsening    Shift Goals  Clinical Goals: free from fall, re-orient, sleeps more than 5 hours  Patient Goals: rest  Family Goals: not present    Progress made toward(s) clinical / shift goals:  progressing    Patient is not progressing towards the following goals:

## 2024-05-22 NOTE — PROGRESS NOTES
Received RN report at bedside  AO x 2. Confused with time and situation/ conversation. Repetitive  Iv site patent, IVF discontinued as ordered  7/10 back pain. Pain med given and drops to 4/10  Ambulating in the room and hallway  Sleeps better tonight  Standby assist, frequent rounding  Room near nurses station  Reminded in the use of call light.  Kept safe and continue monitor    Plan:   Pain mgt  Monitor Ammonia level- bowel movement

## 2024-05-22 NOTE — ASSESSMENT & PLAN NOTE
Addendum: long conversation with patient's father-Lopez.  He states that patient has been away from [place] for the past 6 years.  She moved back to [place] 2 months ago.  She has been living with her father for the last month.  Approximately 2 to 3 months ago she was hospitalized in [place] for 3 weeks.  She underwent alcohol withdrawal during the hospitalization.  Father notes worsening memory concerns over the last year.  He was told that the doctors in [place] could not identify an etiology for her memory changes.  Her father states that since moving to [place]-he would tell her where they live and a few minutes later she would ask again because she had already forgotten.  She does not have insurance so she has not established with a therapist or physician in [place].  ACP time spent 22 minutes.

## 2024-05-22 NOTE — DISCHARGE PLANNING
Attempted discharge planning assessment. She states she does not remember where she lives. Gave consent for me to call father for information. Left VM message requesting return call.

## 2024-05-23 PROBLEM — R63.0 POOR APPETITE: Status: ACTIVE | Noted: 2024-05-23

## 2024-05-23 LAB
CRP SERPL HS-MCNC: 6.95 MG/DL (ref 0–0.75)
ERYTHROCYTE [DISTWIDTH] IN BLOOD BY AUTOMATED COUNT: 48.6 FL (ref 35.9–50)
ERYTHROCYTE [SEDIMENTATION RATE] IN BLOOD BY WESTERGREN METHOD: 92 MM/HOUR (ref 0–25)
HCT VFR BLD AUTO: 31.6 % (ref 37–47)
HGB BLD-MCNC: 10.6 G/DL (ref 12–16)
HIV 1+2 AB+HIV1 P24 AG SERPL QL IA: NORMAL
IRON SATN MFR SERPL: 6 % (ref 15–55)
IRON SERPL-MCNC: 17 UG/DL (ref 40–170)
MCH RBC QN AUTO: 28.8 PG (ref 27–33)
MCHC RBC AUTO-ENTMCNC: 33.5 G/DL (ref 32.2–35.5)
MCV RBC AUTO: 85.9 FL (ref 81.4–97.8)
PLATELET # BLD AUTO: 133 K/UL (ref 164–446)
PMV BLD AUTO: 9.3 FL (ref 9–12.9)
RBC # BLD AUTO: 3.68 M/UL (ref 4.2–5.4)
T PALLIDUM AB SER QL IA: NORMAL
TIBC SERPL-MCNC: 280 UG/DL (ref 250–450)
UIBC SERPL-MCNC: 263 UG/DL (ref 110–370)
VIT B12 SERPL-MCNC: 521 PG/ML (ref 211–911)
WBC # BLD AUTO: 3.5 K/UL (ref 4.8–10.8)

## 2024-05-23 PROCEDURE — 99232 SBSQ HOSP IP/OBS MODERATE 35: CPT | Performed by: NURSE PRACTITIONER

## 2024-05-23 PROCEDURE — 95816 EEG AWAKE AND DROWSY: CPT | Mod: 26 | Performed by: STUDENT IN AN ORGANIZED HEALTH CARE EDUCATION/TRAINING PROGRAM

## 2024-05-23 PROCEDURE — 99232 SBSQ HOSP IP/OBS MODERATE 35: CPT | Performed by: STUDENT IN AN ORGANIZED HEALTH CARE EDUCATION/TRAINING PROGRAM

## 2024-05-23 PROCEDURE — 99255 IP/OBS CONSLTJ NEW/EST HI 80: CPT

## 2024-05-23 PROCEDURE — 4A10X4Z MONITORING OF CENTRAL NERVOUS ELECTRICAL ACTIVITY, EXTERNAL APPROACH: ICD-10-PCS | Performed by: STUDENT IN AN ORGANIZED HEALTH CARE EDUCATION/TRAINING PROGRAM

## 2024-05-23 RX ADMIN — LACTULOSE 30 ML: 20 SOLUTION ORAL at 11:44

## 2024-05-23 RX ADMIN — LACTULOSE 30 ML: 20 SOLUTION ORAL at 05:00

## 2024-05-23 RX ADMIN — FAMOTIDINE 20 MG: 20 TABLET, FILM COATED ORAL at 05:00

## 2024-05-23 RX ADMIN — SENNOSIDES AND DOCUSATE SODIUM 2 TABLET: 50; 8.6 TABLET ORAL at 17:07

## 2024-05-23 RX ADMIN — FAMOTIDINE 20 MG: 20 TABLET, FILM COATED ORAL at 17:07

## 2024-05-23 RX ADMIN — THIAMINE HYDROCHLORIDE 100 MG: 100 INJECTION, SOLUTION INTRAMUSCULAR; INTRAVENOUS at 05:00

## 2024-05-23 SDOH — ECONOMIC STABILITY: TRANSPORTATION INSECURITY
IN THE PAST 12 MONTHS, HAS THE LACK OF TRANSPORTATION KEPT YOU FROM MEDICAL APPOINTMENTS OR FROM GETTING MEDICATIONS?: NO

## 2024-05-23 SDOH — ECONOMIC STABILITY: INCOME INSECURITY: HOW HARD IS IT FOR YOU TO PAY FOR THE VERY BASICS LIKE FOOD, HOUSING, MEDICAL CARE, AND HEATING?: NOT VERY HARD

## 2024-05-23 SDOH — ECONOMIC STABILITY: INCOME INSECURITY: IN THE LAST 12 MONTHS, WAS THERE A TIME WHEN YOU WERE NOT ABLE TO PAY THE MORTGAGE OR RENT ON TIME?: NO

## 2024-05-23 SDOH — ECONOMIC STABILITY: FOOD INSECURITY: WITHIN THE PAST 12 MONTHS, YOU WORRIED THAT YOUR FOOD WOULD RUN OUT BEFORE YOU GOT MONEY TO BUY MORE.: OFTEN TRUE

## 2024-05-23 SDOH — ECONOMIC STABILITY: HOUSING INSECURITY
IN THE LAST 12 MONTHS, WAS THERE A TIME WHEN YOU DID NOT HAVE A STEADY PLACE TO SLEEP OR SLEPT IN A SHELTER (INCLUDING NOW)?: NO

## 2024-05-23 SDOH — ECONOMIC STABILITY: HOUSING INSECURITY: IN THE LAST 12 MONTHS, HOW MANY PLACES HAVE YOU LIVED?: 1

## 2024-05-23 SDOH — ECONOMIC STABILITY: FOOD INSECURITY: WITHIN THE PAST 12 MONTHS, THE FOOD YOU BOUGHT JUST DIDN'T LAST AND YOU DIDN'T HAVE MONEY TO GET MORE.: SOMETIMES TRUE

## 2024-05-23 SDOH — ECONOMIC STABILITY: TRANSPORTATION INSECURITY
IN THE PAST 12 MONTHS, HAS LACK OF TRANSPORTATION KEPT YOU FROM MEETINGS, WORK, OR FROM GETTING THINGS NEEDED FOR DAILY LIVING?: NO

## 2024-05-23 ASSESSMENT — ENCOUNTER SYMPTOMS
VOMITING: 0
BACK PAIN: 0
DIARRHEA: 0
DEPRESSION: 0
COUGH: 0
BLURRED VISION: 0
WEAKNESS: 0
NAUSEA: 0
MEMORY LOSS: 1
CONSTIPATION: 0
DIZZINESS: 0
FEVER: 0
CHILLS: 0
BLOOD IN STOOL: 0
SHORTNESS OF BREATH: 0
HEARTBURN: 0
ABDOMINAL PAIN: 0

## 2024-05-23 ASSESSMENT — PAIN DESCRIPTION - PAIN TYPE
TYPE: ACUTE PAIN
TYPE: ACUTE PAIN

## 2024-05-23 NOTE — CARE PLAN
Problem: Knowledge Deficit - Standard  Goal: Patient and family/care givers will demonstrate understanding of plan of care, disease process/condition, diagnostic tests and medications  Outcome: Not Progressing   The patient is Watcher - Medium risk of patient condition declining or worsening    Shift Goals  Clinical Goals: free from pain; reorientation  Patient Goals: rest  Family Goals: not present    Progress made toward(s) clinical / shift goals:      Patient is not progressing towards the following goals:    Pt is not aware of her care plan; reorient pt; pt denies pain.

## 2024-05-23 NOTE — DISCHARGE PLANNING
GENTRY Jessica spoke with pt's father Lopez through phone call to provide CCM services.   Housing: Pt and father live in a two bedroom apartment.   Transportation: Lopez receives [facility] benefits from [facility]. Pt is looking to get Medicaid. GENTRY Jessica notified Lopez that if pt is approved for Medicaid she is able to use MTM for transportation.   Food: Lopez is currently receiving SNAP benefits but states it is not enough for both of them. CHW provided SNAP contact information so that pt could apply, as well as [facility] information.  Finances: Lopez is currently receiving SSI. He wishes to see if pt will qualify for SSD. [facility]W provided [employer].   PCP Follow up Appointment:   GENTRY Jessica provided CCM contact information and encouraged pt to call if anything was needed. CHW will contact Lopez post discharge to follow up on resources and provide any new ones needed.     Community Health Worker Intake    Identified barriers to food, finances.  Resources provided to [employer], SNAP, and [employer].  Contact information provided to Belinda Rodriguez.  Has PCP appointment scheduled for   Inpatient assessment completed.  Did the patient receive medications post discharge:     Plan:  GENTRY Jessica will contact Lopez post discharge to follow up on resources provided.

## 2024-05-23 NOTE — CARE PLAN
The patient is Stable - Low risk of patient condition declining or worsening    Shift Goals  Clinical Goals: MRI brain, comfort  Patient Goals: rest  Family Goals: dieudonne    Progress made toward(s) clinical / shift goals:        Problem: Pain - Standard  Goal: Alleviation of pain or a reduction in pain to the patient’s comfort goal  Outcome: Progressing     Patient maintains 2 pain level with interventions in place.    Problem: Psychosocial  Goal: Patient's level of anxiety will decrease  Outcome: Progressing     Pt not reporting any anxiety at this time.     Problem: Gastrointestinal Irritability  Goal: Nausea and vomiting will be absent or improve  Outcome: Progressing     Pt free of nausea and stomach pain    Patient is not progressing towards the following goals:

## 2024-05-23 NOTE — PROGRESS NOTES
"..Gastroenterology Progress Note               Author:  Nova Cristobal, SENA,  APRN Date & Time Created: 5/23/2024 7:56 AM       Patient ID:  Name:             Belinda Rodriguez    YOB: 1995  Age:                 29 y.o.  female  MRN:               [id number]        Medical Decision Making, by Problem:  Active Hospital Problems    Diagnosis     ACP (advance care planning) [Z71.89]     Memory deficit [R41.3]     Pancreatitis, pancreatic cyst [K85.90]     Thrombocytopenia (HCC) [D69.6]     Hyperglycemia [R73.9]     Hypokalemia [E87.6]     Encephalopathy [G93.40]      Presenting Chief Complaint:  Pancreatic fluid collection    HISTORY OF PRESENT ILLNESS:  Belinda Rodriguez is a 29 y.o. female with history of alcohol misuse disorder, self-reported gluten and lactose intolerance, cigarette smoking, and depression who presented to the ED on 5/19/2024 with complaints of nausea, vomiting, and mid to lower abdominal pain for 2 days. It is difficult to get a solid history as patient is an extremely poor historian and could not tell me why she came to the hospital. She appears encephalopathic and tangential and repetitive in her answers. She reports that she has had a significant alcohol history and has been drinking 1/2 of 750ml of whiskey every day for about 2 years, maybe more. She states she quit smoking 2 years ago. She doesn't work and can't remember if she has ever seen a gastroenterologist.     She states she has always had stomach issues. She currently complains of \"tingly\" type abdominal pain but doesn't know when it started. Abdomen is mildly distended and diffusely tender on exam. She thinks she is having regular bowel movements every other day and endorses decreased appetite.     Workup to date remarkable for hgb 11.0, platelets 111, lipase 94, Ca 125 37. CT on admission with large complex multiloculated cystic structure in the pancreas. MRCP concurs and relates the fluid could be related to prior " pancreatitis or walled off necrosis. No solid or suspicious pancreatic component identified.     Interval History:  5/22/2024: Patient seen after MRI today to evaluate memory loss, read pending. Complaining of diffuse abdominal tenderness. No leukocytosis, Hgb 10.1. very tearful and says she's scared and worried that her father and sister are mad at her.    5/23/2024: Patient seen, currently undergoing EEG, neuro consulted. Pancytopenia with WBC 3.5, hgb 10.6, and platelet 133. Liver enzymes unchanged.    Hospital Medications:  Current Facility-Administered Medications   Medication Dose Frequency Provider Last Rate Last Admin    thiamine (B-1) injection 100 mg  100 mg DAILY Viktoria Vargas M.D.   100 mg at 05/23/24 0500    gadoteridol (Prohance) injection 10 mL  10 mL Once Viktoria Vargas M.D.        LORazepam (Ativan) tablet 0.5 mg  0.5 mg Once PRN Viktoria Vargas M.D.        lactulose 20 GM/30ML solution 30 mL  30 mL TID Viktoria Vargas M.D.   30 mL at 05/23/24 0500    famotidine (Pepcid) tablet 20 mg  20 mg BID Luis Ann D.OYari   20 mg at 05/23/24 0500    enoxaparin (Lovenox) inj 40 mg  40 mg DAILY AT 1800 Efrem Segura M.D.   40 mg at 05/22/24 1735    labetalol (Normodyne/Trandate) injection 10 mg  10 mg Q4HRS PRN Efrem Segura M.D.        senna-docusate (Pericolace Or Senokot S) 8.6-50 MG per tablet 2 Tablet  2 Tablet Q EVENING Efrem Segura M.D.   2 Tablet at 05/22/24 1735    And    polyethylene glycol/lytes (Miralax) Packet 1 Packet  1 Packet QDAY PRN Efrem Segura M.D.        acetaminophen (Tylenol) tablet 650 mg  650 mg Q6HRS PRN Efrem Segura M.D.   650 mg at 05/20/24 2224    Pharmacy Consult Request ...Pain Management Review 1 Each  1 Each PHARMACY TO DOSE Efrem Segura M.D.        oxyCODONE immediate-release (Roxicodone) tablet 5 mg  5 mg Q3HRS PRN Efrem Segura M.D.   5 mg at 05/21/24 5687    Or    oxyCODONE immediate release (Roxicodone) tablet 10 mg  10 mg  "Q3HRS PRN Efrem Segura M.D.   10 mg at 05/22/24 0525    Or    morphine 4 MG/ML injection 4 mg  4 mg Q3HRS PRPATRICIA Segura M.D.        ondansetron (Zofran) syringe/vial injection 4 mg  4 mg Q4HRS PRPATRICIA Segura M.D.        ondansetron (Zofran ODT) dispertab 4 mg  4 mg Q4HRS PRPATRICIA Segura M.D.        promethazine (Phenergan) tablet 12.5-25 mg  12.5-25 mg Q4HRS GORDON Segura M.D.        promethazine (Phenergan) suppository 12.5-25 mg  12.5-25 mg Q4HRS PRPATRICIA Segura M.D.        prochlorperazine (Compazine) injection 5-10 mg  5-10 mg Q4HRS GORDON Segura M.D.       Last reviewed on 5/19/2024  9:41 PM by Marii Reed [facility]       Review of Systems:  Review of Systems   Constitutional:  Negative for chills, fever and malaise/fatigue.   HENT:  Negative for hearing loss.    Eyes:  Negative for blurred vision.   Respiratory:  Negative for cough and shortness of breath.    Cardiovascular:  Negative for chest pain and leg swelling.   Gastrointestinal:  Negative for abdominal pain, blood in stool, constipation, diarrhea, heartburn, melena, nausea and vomiting.   Genitourinary:  Negative for dysuria.   Musculoskeletal:  Negative for back pain.   Skin:  Negative for rash.   Neurological:  Negative for dizziness and weakness.   Psychiatric/Behavioral:  Positive for memory loss. Negative for depression.    All other systems reviewed and are negative.        Vital signs:  Weight/BMI: Body mass index is 20.56 kg/m².  BP (!) 86/59   Pulse 71   Temp 36.3 °C (97.3 °F) (Temporal)   Resp 18   Ht 1.575 m (5' 2\")   Wt 51 kg (112 lb 7 oz)   SpO2 99%   Vitals:    05/22/24 1538 05/22/24 1945 05/23/24 0347 05/23/24 0657   BP: 105/75 96/68 110/59 (!) 86/59   Pulse: 66 98 62 71   Resp: 16 18 18 18   Temp: 36.6 °C (97.8 °F) 36.6 °C (97.8 °F) 36.3 °C (97.4 °F) 36.3 °C (97.3 °F)   TempSrc: Temporal Tympanic Tympanic Temporal   SpO2: 100% 98% 97% 99%   Weight:       Height:         Oxygen Therapy:  " Pulse Oximetry: 99 %, O2 (LPM): 0, O2 Delivery Device: None - Room Air    Intake/Output Summary (Last 24 hours) at 5/23/2024 0756  Last data filed at 5/22/2024 1538  Gross per 24 hour   Intake 360 ml   Output --   Net 360 ml         Physical Exam:  Physical Exam  Vitals and nursing note reviewed.   Constitutional:       General: She is not in acute distress.     Appearance: Normal appearance.   HENT:      Head: Normocephalic and atraumatic.      Right Ear: External ear normal.      Left Ear: External ear normal.      Nose: Nose normal.      Mouth/Throat:      Mouth: Mucous membranes are moist.      Pharynx: Oropharynx is clear.   Eyes:      General: No scleral icterus.  Cardiovascular:      Rate and Rhythm: Normal rate and regular rhythm.      Pulses: Normal pulses.      Heart sounds: Normal heart sounds.   Pulmonary:      Effort: Pulmonary effort is normal. No respiratory distress.      Breath sounds: Normal breath sounds.   Abdominal:      General: Abdomen is flat. Bowel sounds are normal. There is no distension.      Palpations: Abdomen is soft.      Tenderness: There is abdominal tenderness.   Musculoskeletal:         General: Normal range of motion.      Cervical back: Normal range of motion.   Skin:     General: Skin is warm and dry.      Capillary Refill: Capillary refill takes less than 2 seconds.      Coloration: Skin is pale.   Neurological:      Mental Status: She is alert. She is disoriented.   Psychiatric:      Comments: Tearful              Labs:  Recent Labs     05/21/24 0136 05/22/24 0422   SODIUM 140 139   POTASSIUM 3.9 3.9   CHLORIDE 108 104   CO2 18* 22   BUN 5* 4*   CREATININE 0.39* 0.45*   MAGNESIUM 2.1  --    PHOSPHORUS 2.9  --    CALCIUM 8.6 8.7     Recent Labs     05/21/24 0136 05/22/24 0422   ALTSGPT 10 9   ASTSGOT 16 14   ALKPHOSPHAT 74 74   TBILIRUBIN 0.7 0.5   GLUCOSE 93 113*     Recent Labs     05/21/24 0136 05/22/24 0422 05/23/24  0118   WBC 6.0 5.3 3.5*   NEUTSPOLYS 64.70  --    --    LYMPHOCYTES 24.70  --   --    MONOCYTES 9.00  --   --    EOSINOPHILS 0.80  --   --    BASOPHILS 0.30  --   --    ASTSGOT 16 14  --    ALTSGPT 10 9  --    ALKPHOSPHAT 74 74  --    TBILIRUBIN 0.7 0.5  --      Recent Labs     05/21/24  0136 05/21/24  1125 05/22/24  0422 05/23/24  0118   RBC 3.85*  --  3.54* 3.68*   HEMOGLOBIN 11.0*  --  10.1* 10.6*   HEMATOCRIT 33.3*  --  30.1* 31.6*   PLATELETCT 111*  --  110* 133*   PROTHROMBTM  --  14.4  --   --    INR  --  1.11  --   --    IRON  --   --   --  17*   TOTIRONBC  --   --   --  280     Recent Results (from the past 24 hour(s))   IRON/TOTAL IRON BIND    Collection Time: 05/23/24  1:18 AM   Result Value Ref Range    Iron 17 (L) 40 - 170 ug/dL    Total Iron Binding 280 250 - 450 ug/dL    Unsat Iron Binding 263 110 - 370 ug/dL    % Saturation 6 (L) 15 - 55 %   CBC WITHOUT DIFFERENTIAL    Collection Time: 05/23/24  1:18 AM   Result Value Ref Range    WBC 3.5 (L) 4.8 - 10.8 K/uL    RBC 3.68 (L) 4.20 - 5.40 M/uL    Hemoglobin 10.6 (L) 12.0 - 16.0 g/dL    Hematocrit 31.6 (L) 37.0 - 47.0 %    MCV 85.9 81.4 - 97.8 fL    MCH 28.8 27.0 - 33.0 pg    MCHC 33.5 32.2 - 35.5 g/dL    RDW 48.6 35.9 - 50.0 fL    Platelet Count 133 (L) 164 - 446 K/uL    MPV 9.3 9.0 - 12.9 fL   VITAMIN B12    Collection Time: 05/23/24  1:18 AM   Result Value Ref Range    Vitamin B12 -True Cobalamin 521 211 - 911 pg/mL       Radiology Review:  MR-ABDOMEN-WITH & W/O   Final Result         1. There is a large multiloculated fluid collection replacing the pancreatic parenchyma. This could be related to prior pancreatitis or walled-off necrosis.      2. No solid or suspicious pancreatic component identified.      CT-ABDOMEN-PELVIS WITH   Final Result         1.  Large complex multiloculated cystic structure in the pancreas, could represent large pseudocyst, however other cystic pancreatic lesion is not definitively excluded. Recommend follow-up MRI of the pancreas with contrast for further characterization     as clinically appropriate.   2.  Slight peripancreatic fat stranding, consider component of pancreatitis.   3.  Mild intrahepatic biliary ductal dilatation   4.  Trace free fluid collection the pelvis      These findings were discussed with the patient's clinician, Brendan Multani, on 5/19/2024 9:02 PM.      US-RUQ   Final Result      1.  Tubular fluid-filled structure at the midline abdomen measuring up to 6 cm transverse. This may be aberrantly dilated bowel. Recommend further assessment with CT abdomen and pelvis.   2.  Bidirectional flow in the main portal vein which is suspicious for portal hypertension.   3.  Collateral veins at the liver and spleen.   4.  The liver is diffusely increased in echogenicity.  This is nonspecific though can be seen with fatty infiltration or other hepatocellular disease.      MR-BRAIN-WITH & W/O    (Results Pending)       MDM (Data Review):   -Records reviewed and summarized in current documentation  -I personally reviewed and interpreted the laboratory results  -I personally reviewed the radiology images    Assessment/Recommendations:  Large multiloculated fluid collection replacing the pancreatic parenchyma, prior pancreatitis vs walled-off necrosis  Encephalopathy - suspicious that patient has been drinking heavily for years and may have Wernicke's. Neuro consulted and planning for EEG and lumbar puncture.   Depression  Pancytopenia  Slightly elevated     Recommendations:  Conservative management given encephalopathy and unclear ability to consent to treatment, would need significant and close follow up   If she develops leukocytosis, increasing pain, or signs of worsening pancreatitis would recommend cyst gastrostomy or IR  Primary team dicussed with her father   Suspect an element of malnourishment given amount of hair loss and general frailty  Recommend iron, Vit A, B, D studies and replenishment as indicated  Started on IV thiamine  Will need to follow with a PCP/GI  team for serial labs and imaging given about 50% of pancreatic fluid collections will self-resolve    [facility] will sign off. Please don't hesitate to reconsult if needed.    Plan discussed with patient, Dr. Vargas, Dr. Humphreys    Core Quality Measures   Reviewed items::  Labs, Medications and Radiology reports reviewed

## 2024-05-23 NOTE — DISCHARGE PLANNING
Care Transition Team Assessment    Emergency Contact is pt's father Lopez Rodriguez ([phone])     CM RN spoke with pt's father Lopez on the phone to complete discharge assessment. Pt is A/OX2 at this time and unable to participate in assessment. Information on face sheet verified.     - Prior to admission, pt lived with her father Lopez in an apartment at the address verified on face sheet [address]  [place] [address]  - Lopez stated he is pt's primary support system and plans to take pt home when she is medically clear.   - Pt stated to be independent with ADL/IADLs prior to this admission.   - Per Lopez, pt did not use any DME or O2 was owned/used prior to admission   - Pt is unemployed and insured uninsured   - Per Lopez, pt had lost all of identification cards which included drivers license and SSN card.   - Pt was not established with PCP prior to admission.   - Preferred pharmacy is the [facility] pharmacy on [address] [place] [address]    CM RN requested CHW assistance in providing pt's father Lopez with community resources for pt.      Information Source  Orientation Level: Oriented to person, Disoriented to time, Disoriented to situation, Oriented to place (forgetful)  Information Given By: Parent  Informant's Name: Lopez Rodriguez  Who is responsible for making decisions for patient? : Legal next of kin  Name(s) of Primary Decision Maker: Lopez Rodriguez    Readmission Evaluation  Is this a readmission?: No    Elopement Risk  Legal Hold: No  Ambulatory or Self Mobile in Wheelchair: Yes  Disoriented: Place-At Risk for Elopement, Time-At Risk for Elopement, Situation-At Risk for Elopement  Elopement Risk: Not at Risk for Elopement  Wanderguard On: Unavailable  Personal Belongings: Hospital Clothing Only    Interdisciplinary Discharge Planning  Lives with - Patient's Self Care Capacity: Parents, Other (Comments) (father)  Patient or legal guardian wants to designate a caregiver: No  Support  Systems: Family Member(s)  Housing / Facility: 1 Story Apartment / Condo    Discharge Preparedness  What is your plan after discharge?: Home with help  What are your discharge supports?: Parent  Prior Functional Level: Ambulatory, Independent with Activities of Daily Living, Independent with Medication Management    Functional Assesment  Prior Functional Level: Ambulatory, Independent with Activities of Daily Living, Independent with Medication Management    Finances  Financial Barriers to Discharge: Yes  Average Monthly Income: 0 $  Source of Income: None  Prescription Coverage: No  Prescription Coverage Comments: uninsured    Vision / Hearing Impairment  Vision Impairment : No  Hearing Impairment : No    Advance Directive  Advance Directive?: None    Domestic Abuse  Have you ever been the victim of abuse or violence?: No  Possible Abuse/Neglect Reported to:: Not Applicable    Discharge Risks or Barriers  Discharge risks or barriers?: No PCP, Uninsured / underinsured, Transportation, Complex medical needs  Patient risk factors: Cognitive / sensory / physical deficit, Complex medical needs, Mental health, No PCP, Uninsured or underinsured    Anticipated Discharge Information  Discharge Disposition: Discharged to home/self care (01)  Discharge Address: [address] [place] [id number]  Discharge Contact Phone Number: [phone]

## 2024-05-23 NOTE — PROGRESS NOTES
Hospital Medicine Daily Progress Note    Date of Service  5/23/2024    Chief Complaint  Belinda Rodriguez is a 29 y.o. female admitted 5/19/2024 with abdominal pain.     Hospital Course  Belinda Rodriguez is a 29 yr old female with a PMHx of endometriosis, depression and alcohol abuse. Admitted 5/19 for abdominal pain.     CT A/P:  Large complex multiloculated cystic structure in the pancreas, could represent large pseudocyst, however other cystic pancreatic lesion is not definitively excluded.     MRCP:   Large multiloculated fluid collection replacing the pancreatic parenchyma. This could be related to prior pancreatitis or walled-off necrosis.     Discussed with GI. Plan to continue with conservative management at this time.     MRI brain and trial of lactulose is pending for further evaluation of memory loss.     Interval Problem Update  5/21: No acute overnight events. Patient is unable to answer questions about her health history, living situation, family/support system. MRI brain pending. GI consult today.     5/22: Some improvement in mentation overnight. Patient understands she is in the hospital. She states she has been drinking heavily for an unknown amount of time. She does not remember when she drank last. She thinks she lives with her dad, but is unsure. MRI brain is pending. High dose thiamine started today.      5/23: Vitals stable overnight. Wbc stable at 3.5. Hb stable at 10.6. Platelet stable at 133. Iron 17. MRI brain was largely within normal limits. Neuro consult placed today. Nutrition consult placed today.     I have discussed this patient's plan of care and discharge plan at IDT rounds today with Case Management, Nursing, Nursing leadership, and other members of the IDT team.    Consultants/Specialty  GI    Code Status  Full Code    Disposition  The patient is not medically cleared for discharge to home or a post-acute facility.      I have placed the appropriate orders for post-discharge  needs.    Review of Systems  Review of Systems   Constitutional:  Negative for fever.   Respiratory:  Negative for shortness of breath.    Cardiovascular:  Negative for chest pain.   Gastrointestinal:  Negative for abdominal pain, nausea and vomiting.        Physical Exam  Temp:  [36.3 °C (97.3 °F)-36.6 °C (97.8 °F)] 36.3 °C (97.3 °F)  Pulse:  [62-98] 71  Resp:  [16-18] 18  BP: ()/(55-75) 101/55  SpO2:  [97 %-100 %] 99 %    Physical Exam  Vitals and nursing note reviewed.   Constitutional:       General: She is not in acute distress.     Appearance: Normal appearance. She is not ill-appearing.   Cardiovascular:      Rate and Rhythm: Normal rate and regular rhythm.      Heart sounds: Murmur heard.   Pulmonary:      Effort: Pulmonary effort is normal. No respiratory distress.      Breath sounds: Normal breath sounds. No wheezing.   Abdominal:      General: Abdomen is flat. Bowel sounds are normal. There is no distension.      Palpations: Abdomen is soft.      Tenderness: There is no abdominal tenderness.   Skin:     General: Skin is warm and dry.   Neurological:      Mental Status: She is alert and oriented to person, place, and time.   Psychiatric:         Mood and Affect: Mood normal. Affect is flat.         Behavior: Behavior is cooperative.         Cognition and Memory: Cognition is impaired. Memory is impaired. She exhibits impaired recent memory and impaired remote memory.         Fluids    Intake/Output Summary (Last 24 hours) at 5/23/2024 1439  Last data filed at 5/22/2024 1538  Gross per 24 hour   Intake 240 ml   Output --   Net 240 ml       Laboratory  Recent Labs     05/21/24  0136 05/22/24  0422 05/23/24  0118   WBC 6.0 5.3 3.5*   RBC 3.85* 3.54* 3.68*   HEMOGLOBIN 11.0* 10.1* 10.6*   HEMATOCRIT 33.3* 30.1* 31.6*   MCV 86.5 85.0 85.9   MCH 28.6 28.5 28.8   MCHC 33.0 33.6 33.5   RDW 49.8 47.5 48.6   PLATELETCT 111* 110* 133*   MPV 9.4 9.3 9.3     Recent Labs     05/21/24  0136 05/22/24  0422   SODIUM  140 139   POTASSIUM 3.9 3.9   CHLORIDE 108 104   CO2 18* 22   GLUCOSE 93 113*   BUN 5* 4*   CREATININE 0.39* 0.45*   CALCIUM 8.6 8.7     Recent Labs     05/21/24  1125   INR 1.11               Imaging  MR-BRAIN-WITH & W/O   Final Result      There are few nonspecific T2 hyperintensities in the subcortical and periventricular white matter. The differential diagnosis includes nonspecific foci of gliosis and demyelination. 2 of the lesions are periventricular in location. These findings does    not fulfill the Guerrero MR criteria for diagnosing multiple sclerosis. Please correlate with the clinical and other laboratory evidence.      MR-ABDOMEN-WITH & W/O   Final Result         1. There is a large multiloculated fluid collection replacing the pancreatic parenchyma. This could be related to prior pancreatitis or walled-off necrosis.      2. No solid or suspicious pancreatic component identified.      CT-ABDOMEN-PELVIS WITH   Final Result         1.  Large complex multiloculated cystic structure in the pancreas, could represent large pseudocyst, however other cystic pancreatic lesion is not definitively excluded. Recommend follow-up MRI of the pancreas with contrast for further characterization    as clinically appropriate.   2.  Slight peripancreatic fat stranding, consider component of pancreatitis.   3.  Mild intrahepatic biliary ductal dilatation   4.  Trace free fluid collection the pelvis      These findings were discussed with the patient's clinician, Brendan Multani, on 5/19/2024 9:02 PM.      US-RUQ   Final Result      1.  Tubular fluid-filled structure at the midline abdomen measuring up to 6 cm transverse. This may be aberrantly dilated bowel. Recommend further assessment with CT abdomen and pelvis.   2.  Bidirectional flow in the main portal vein which is suspicious for portal hypertension.   3.  Collateral veins at the liver and spleen.   4.  The liver is diffusely increased in echogenicity.  This is  nonspecific though can be seen with fatty infiltration or other hepatocellular disease.           Assessment/Plan  * Encephalopathy  Assessment & Plan  Significant short term memory loss. Unable to communicate where she lives; become lost in the hallways; unable to communicate regarding friends/family in the area.   Possible secondary to Wernicke given history of alcohol abuse     - MRI brain largely within normal limits  - No improvement with trial of lactulose-  discontinue at this time  - Continue IV thiamine Day 2/3 today  - Discussed with neurology- JAMES Rehman  - FLOYD pending  - EEG pending    Poor appetite  Assessment & Plan  Patient unable to quantify how much weight she has lost.  However, her father states she has very poor appetite and poor p.o. intake at home.  - Nutrition consult placed today    ACP (advance care planning)  Assessment & Plan  Addendum: long conversation with patient's father-Lopez.  He states that patient has been away from [place] for the past 6 years.  She moved back to [place] 2 months ago.  She has been living with her father for the last month.  Approximately 2 to 3 months ago she was hospitalized in [place] for 3 weeks.  She underwent alcohol withdrawal during the hospitalization.  Father notes worsening memory concerns over the last year.  He was told that the doctors in [place] could not identify an etiology for her memory changes.  Her father states that since moving to [place]-he would tell her where they live and a few minutes later she would ask again because she had already forgotten.  She does not have insurance so she has not established with a therapist or physician in [place].  ACP time spent 22 minutes.    Memory deficit  Assessment & Plan  Secondary to Wernicke vs other processes    Hypokalemia  Assessment & Plan  Monitor and repalce    Hyperglycemia  Assessment & Plan  A1c 4.8    Thrombocytopenia (HCC)  Assessment & Plan  Platelet 111  Secondary to alcohol abuse   -  Monitor closely for signs of bleeding  - Repeat CBC in AM    Pancreatitis, pancreatic cyst- (present on admission)  Assessment & Plan  MRCP: large multiloculated fluid collection replacing the pancreatic parenchyma. This could be related to prior pancreatitis or walled-off necrosis.   - Discussed with GI today; continue with non operative management   - Continue medical management at this time   - If increasing WBC, fevers, change in abdominal exam - consider drainage          VTE prophylaxis:    enoxaparin ppx      I have performed a physical exam and reviewed and updated ROS and Plan today (5/23/2024). In review of yesterday's note (5/22/2024), there are no changes except as documented above.

## 2024-05-23 NOTE — PROCEDURES
INPATIENT ROUTINE VIDEO ELECTROENCEPHALOGRAM REPORT    REFERRING PROVIDER: Viktoria Vargas M.d.  DOS: 5/23/2024  STUDY DURATION: 0 hours and 24 minutes of total recording time.     INDICATION:  Belinda Rodriguez 29 y.o. female presenting with altered mental status    RELEVANT MEDICATIONS/TREATMENTS:   Scheduled Medications   Medication Dose Frequency    thiamine  100 mg DAILY    famotidine  20 mg BID    enoxaparin (LOVENOX) injection  40 mg DAILY AT 1800    senna-docusate  2 Tablet Q EVENING    Pharmacy Consult Request  1 Each PHARMACY TO DOSE       TECHNIQUE:   Routine VEEG was set up by a Neurodiagnostic technologist who performed education to the patient and staff. A minimum of 23 electrodes and 23 channel recording was setup and performed by Neurodiagnostic technologist, in accordance with the international 10-20 system. The study was reviewed in bipolar and referential montages. The recording examined the patient in the  awake state(s).     DESCRIPTION OF THE RECORD:  During wakefulness, the background was continuous and showed a 11.5 Hz posterior dominant rhythm.  There was reactivity to eye closure/opening.  An anterior-posterior gradient was noted with faster beta frequencies seen anteriorly.  During drowsiness, theta/delta frequencies were seen.    EEG Sleep: N2 sleep architecture was not seen.    ICTAL AND INTERICTAL FINDINGS:   No focal or generalized epileptiform activity noted.     No regional slowing or persistent focal asymmetries were seen.    No seizures.     ACTIVATION PROCEDURES:   Hyperventilation was performed by the patient for a total of 3 minutes. The technician performing the test noted good effort. This technique did not elicited any abnormalities on EEG.  and Intermittent Photic stimulation was performed in a stepwise fashion from 1 to 30 Hz and elicited a normal response (photic driving), most noticeable in the posterior leads.    EKG: Sampling of the EKG recording showed sinus  rhythm    EVENTS:  None    INTERPRETATION:   Normal video EEG recording in the awake state(s):  - No regional slowing or persistent focal asymmetries were seen.  - No epileptiform discharges or other epileptiform phenomena seen.  - No seizures. Clinical correlation is recommended.      Note: A normal EEG does not rule out the possibility of seizures or exclude a diagnosis of epilepsy.  If the clinical suspicion remains high for seizures, a prolonged recording to capture clinical or subclinical events may be helpful.    Wilma Josue MD  Department of Neurology at [facility]  General Neurologist and Epileptologist   of Clinical Neurology, [facility].

## 2024-05-23 NOTE — PROGRESS NOTES
Report received from NOC RN and assumed patient care at 0700. Patient is A&Ox 1, disoriented to time, place, situation, on RA, and laying comfortably in the bed, calm unlabored breathing.  Patient reporting a pain level of 0. Call light within reach and bed in lowest position. Reinforced the need to call for assistance. Plan of care discussed and patient does not have any further needs at this time.

## 2024-05-23 NOTE — CONSULTS
Neurology Initial Consult H&P  Neurohospitalist Service, [facility]    Referring Physician: Viktoria Vargas M.D.    Chief Complaint   Patient presents with    Abdominal Pain    Nausea/Vomiting/Diarrhea       HPI: Belinda Rodriguez is a 29 y.o. female with history of endometriosis, depression, alcohol abuse presenting for abdominal pain, found to have large multi-loculated fluid collection replacing the pancreatic parenchyma, being conservatively managed. She was found to have very poor short term memory and neurology was consulted for evaluation.    Patient unable to provide much history, primarily obtained from her father and chart review. She lived in [place] and worked as [employer] but moved away about 6 years ago to [place]. About 1-2 years ago, her father noticed that she had worsening short term memory. She also was drinking heavily, about 325 - 750mL of whiskey daily. She went to a hospital in [place] a few months ago for alcohol cessation and during that time was noted to have significant memory issues. Reportedly no etiology was found for her amnesia. She moved to [place] 2 months ago and has been living with her father, who noted that she has extremely poor short term memory, forgets where she lives a few minutes after being told. She also has poor appetite and has lost an unknown amount of weight.     Review of systems: In addition to what is detailed in the HPI above, all other systems reviewed and are negative.     Past Medical History:    has a past medical history of Alcohol abuse and Depression.    FHx:  family history is not on file.    SHx:   reports that she has quit smoking. Her smoking use included cigarettes. She does not have any smokeless tobacco history on file. She reports that she does not currently use alcohol. She reports that she does not currently use drugs.    Allergies:  Allergies   Allergen Reactions    Peach [Prunus Persica] Itching        Medications:    Current Facility-Administered Medications:     thiamine (B-1) injection 100 mg, 100 mg, Intravenous, DAILY, Viktoria Vargas M.D., 100 mg at 05/23/24 0500    LORazepam (Ativan) tablet 0.5 mg, 0.5 mg, Oral, Once PRN, Viktoria Vargas M.D.    lactulose 20 GM/30ML solution 30 mL, 30 mL, Oral, TID, Viktoria Vargas M.D., 30 mL at 05/23/24 1144    famotidine (Pepcid) tablet 20 mg, 20 mg, Oral, BID, Luis Ann D.OYari, 20 mg at 05/23/24 0500    enoxaparin (Lovenox) inj 40 mg, 40 mg, Subcutaneous, DAILY AT 1800, Efrem Segura M.D., 40 mg at 05/22/24 1735    labetalol (Normodyne/Trandate) injection 10 mg, 10 mg, Intravenous, Q4HRS PRN, Efrem Segura M.D.    senna-docusate (Pericolace Or Senokot S) 8.6-50 MG per tablet 2 Tablet, 2 Tablet, Oral, Q EVENING, 2 Tablet at 05/22/24 1735 **AND** polyethylene glycol/lytes (Miralax) Packet 1 Packet, 1 Packet, Oral, QDAY PRN, Efrem Segura M.D.    acetaminophen (Tylenol) tablet 650 mg, 650 mg, Oral, Q6HRS PRN, Efrem Segura M.D., 650 mg at 05/20/24 2224    Notify provider if pain remains uncontrolled, , , CONTINUOUS **AND** Use the Numeric Rating Scale (NRS), Locke-Baker Faces (WBF), or FLACC on regular floors and Critical-Care Pain Observation Tool (CPOT) on ICUs/Trauma to assess pain, , , CONTINUOUS **AND** Pulse Ox, , , CONTINUOUS **AND** Pharmacy Consult Request ...Pain Management Review 1 Each, 1 Each, Other, PHARMACY TO DOSE **AND** If patient difficult to arouse and/or has respiratory depression (respiratory rate of 10 or less), stop any opiates that are currently infusing and call a Rapid Response., , , CONTINUOUS, Efrem Segura M.D.    oxyCODONE immediate-release (Roxicodone) tablet 5 mg, 5 mg, Oral, Q3HRS PRN, 5 mg at 05/21/24 4385 **OR** oxyCODONE immediate release (Roxicodone) tablet 10 mg, 10 mg, Oral, Q3HRS PRN, 10 mg at 05/22/24 6874 **OR** morphine 4 MG/ML injection 4 mg, 4 mg, Intravenous, Q3HRS PRN, Efrem Segura,  "M.D.    ondansetron (Zofran) syringe/vial injection 4 mg, 4 mg, Intravenous, Q4HRS PRN, Efrem Segura M.D.    ondansetron (Zofran ODT) dispertab 4 mg, 4 mg, Oral, Q4HRS PRN, Efrem Segura M.D.    promethazine (Phenergan) tablet 12.5-25 mg, 12.5-25 mg, Oral, Q4HRS PRN, Efrem Segura M.D.    promethazine (Phenergan) suppository 12.5-25 mg, 12.5-25 mg, Rectal, Q4HRS PRN, Efrem Segura M.D.    prochlorperazine (Compazine) injection 5-10 mg, 5-10 mg, Intravenous, Q4HRS PRN, Efrem Segura M.D.    Physical Examination:     General: Patient is awake and in no acute distress. She appears pale, thin and has notable amount of shedding hair in the bed.  Eye: Examination of optic disks not indicated at this time given acuity of consult  Neck: There is normal range of motion  CV: regular rate   Extremities:  clear, dry, intact, without peripheral edema    NEUROLOGICAL EXAM:     /55   Pulse 71   Temp 36.3 °C (97.3 °F) (Temporal)   Resp 18   Ht 1.575 m (5' 2\")   Wt 51 kg (112 lb 7 oz)   LMP 04/19/2024   SpO2 99%   BMI 20.56 kg/m²       Mental status: Awake, alert. Pleasant calm affect. States name correctly. Year \"I don't know, sometime in the 2020s?\", location \"[facility]ospital\", city \"I don't know\". Situation \"I don't know how I got here.\". Complex attention intact, able to spell \"ocean\" backwards, does serial 7s correctly. Able to tell me details about her high school, but says she has minimal recall of the last few years, \"I feel like I have fog in my brain\".   Speech and language: Speech is clear and fluent. The patient is able to name and repeat, and follow commands.   Cranial nerve exam: Pupils are equal, round and reactive to light bilaterally. Visual fields are full. She has bilateral horizontal gaze-evoked nystagmus.  Extraocular muscles are intact. Face is symmetric. Sensation in the face is intact to light touch. Palate elevates symmetrically. Tongue is midline.  Motor exam: There is sustained " "antigravity with no downward drift in bilateral arms and legs, 4+/5 globally.  There is no pronator drift. Tone is normal. No abnormal movements were seen on exam.  Sensory exam: Reacts to tactile in all 4 extremities, no neglect to double stim   Deep tendon reflexes:  2+ throughout. Toes equivocal favoring down-going bilaterally.  Coordination: No dysmetria on bilateral finger-to-nose or heel-shin testing  Gait: Able to stand unassisted. Stable native gait.       Objective Data:    Labs:  Lab Results   Component Value Date/Time    PROTHROMBTM 14.4 05/21/2024 11:25 AM    INR 1.11 05/21/2024 11:25 AM      Lab Results   Component Value Date/Time    WBC 3.5 (L) 05/23/2024 01:18 AM    RBC 3.68 (L) 05/23/2024 01:18 AM    HEMOGLOBIN 10.6 (L) 05/23/2024 01:18 AM    HEMATOCRIT 31.6 (L) 05/23/2024 01:18 AM    MCV 85.9 05/23/2024 01:18 AM    MCH 28.8 05/23/2024 01:18 AM    MCHC 33.5 05/23/2024 01:18 AM    MPV 9.3 05/23/2024 01:18 AM    NEUTSPOLYS 64.70 05/21/2024 01:36 AM    LYMPHOCYTES 24.70 05/21/2024 01:36 AM    MONOCYTES 9.00 05/21/2024 01:36 AM    EOSINOPHILS 0.80 05/21/2024 01:36 AM    BASOPHILS 0.30 05/21/2024 01:36 AM      Lab Results   Component Value Date/Time    SODIUM 139 05/22/2024 04:22 AM    POTASSIUM 3.9 05/22/2024 04:22 AM    CHLORIDE 104 05/22/2024 04:22 AM    CO2 22 05/22/2024 04:22 AM    GLUCOSE 113 (H) 05/22/2024 04:22 AM    BUN 4 (L) 05/22/2024 04:22 AM    CREATININE 0.45 (L) 05/22/2024 04:22 AM      No results found for: \"CHOLSTRLTOT\", \"LDL\", \"HDL\", \"TRIGLYCERIDE\"    Lab Results   Component Value Date/Time    ALKPHOSPHAT 74 05/22/2024 04:22 AM    ASTSGOT 14 05/22/2024 04:22 AM    ALTSGPT 9 05/22/2024 04:22 AM    TBILIRUBIN 0.5 05/22/2024 04:22 AM        Imaging/Testing:    I interpreted and/or reviewed the patient's neuroimaging    MR-BRAIN-WITH & W/O   Final Result      There are few nonspecific T2 hyperintensities in the subcortical and periventricular white matter. The differential diagnosis includes " nonspecific foci of gliosis and demyelination. 2 of the lesions are periventricular in location. These findings does    not fulfill the Guerrero MR criteria for diagnosing multiple sclerosis. Please correlate with the clinical and other laboratory evidence.      MR-ABDOMEN-WITH & W/O   Final Result         1. There is a large multiloculated fluid collection replacing the pancreatic parenchyma. This could be related to prior pancreatitis or walled-off necrosis.      2. No solid or suspicious pancreatic component identified.      CT-ABDOMEN-PELVIS WITH   Final Result         1.  Large complex multiloculated cystic structure in the pancreas, could represent large pseudocyst, however other cystic pancreatic lesion is not definitively excluded. Recommend follow-up MRI of the pancreas with contrast for further characterization    as clinically appropriate.   2.  Slight peripancreatic fat stranding, consider component of pancreatitis.   3.  Mild intrahepatic biliary ductal dilatation   4.  Trace free fluid collection the pelvis      These findings were discussed with the patient's clinician, Brendan Multani, on 5/19/2024 9:02 PM.      US-RUQ   Final Result      1.  Tubular fluid-filled structure at the midline abdomen measuring up to 6 cm transverse. This may be aberrantly dilated bowel. Recommend further assessment with CT abdomen and pelvis.   2.  Bidirectional flow in the main portal vein which is suspicious for portal hypertension.   3.  Collateral veins at the liver and spleen.   4.  The liver is diffusely increased in echogenicity.  This is nonspecific though can be seen with fatty infiltration or other hepatocellular disease.          Impression and Recommendations: Belinda Rodriguez is a 29 y.o. presenting for abdominal pain and pancreatic fluid collection whom neurology has been consulted for memory loss. 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 Her  memory loss has unclear timing but has been progressing for at least the last year per her father's history. She does otherwise demonstrate intact complex attention and has some insight of her memory deficit. Does also have a horizontal gaze-evoked nystagmus. Denies headache, fever, numbness, vision changes, or paresthesias. MRI brain on my review with non-specific T2 white matter hyperintensities and appears to have more generalized atrophy than expected for her age.    Given her history of heavy alcohol use in about the same timeframe as the onset of her symptoms, suspicion high for Wernicke's encephalopathy. However her symptoms are somewhat atypical and would still recommend completing work-up for other possible reversible causes of her amnestic syndrome with an EEG and LP. Will check CSF for evidence of infectious, inflammatory, or autoimmune etiology. Check HIV / syphilis panels.       Plan:  - Routine EEG  - Send serum HIV and syphilis screening  - Agree with checking for nutritional deficiencies, replace as indicated  - Agree with high dose thiamine - a typical regimen would be 500mg IV TID for 2-3 days followed by 250mg IV/IM daily for 3-5 days, followed by 100mg oral daily maintenance  - Lumbar puncture, send CSF for : Cell count, protein, glucose, culture/gram stain, meningitis/encephalitis PCR, autoimmune encephalopathy PCR  - Neurology will continue to follow.       The evaluation of the patient, and recommended management, was discussed with Dr. Overton and the care team      JAMES Rehman  Neurohospitalist Services      I personally provided 85 minutes of total acute neurologic care time outside of time spent on separately billable/documented procedures. Time includes: review of laboratory data, review of radiology studies, discussion with consultants, discussion with family/patient, monitoring for potential decompensation.  Interventions were performed as documented in the chart

## 2024-05-23 NOTE — ASSESSMENT & PLAN NOTE
Patient unable to quantify how much weight she has lost.  However, her father states she has very poor appetite and poor p.o. intake at home.  - Nutrition consult pending

## 2024-05-24 PROBLEM — D50.8 IRON DEFICIENCY ANEMIA SECONDARY TO INADEQUATE DIETARY IRON INTAKE: Status: ACTIVE | Noted: 2024-05-24

## 2024-05-24 LAB
BURR CELLS/RBC NFR CSF MANUAL: 0 %
CLARITY CSF: CLEAR
COLOR CSF: COLORLESS
COLOR SPUN CSF: COLORLESS
CSF COMMENTS 1658: NORMAL
FOLATE SERPL-MCNC: 19.3 NG/ML
GLUCOSE CSF-MCNC: 63 MG/DL (ref 40–80)
GRAM STN SPEC: NORMAL
NUC CELL # CSF: <1 CELLS/UL (ref 0–10)
PROT CSF-MCNC: 24 MG/DL (ref 15–45)
RBC # CSF: 2 CELLS/UL
SIGNIFICANT IND 70042: NORMAL
SITE SITE: NORMAL
SOURCE SOURCE: NORMAL
SPECIMEN VOL CSF: 18 ML
TUBE # CSF: 4
TUBE # CSF: NORMAL

## 2024-05-24 PROCEDURE — 62270 DX LMBR SPI PNXR: CPT | Performed by: STUDENT IN AN ORGANIZED HEALTH CARE EDUCATION/TRAINING PROGRAM

## 2024-05-24 PROCEDURE — 99232 SBSQ HOSP IP/OBS MODERATE 35: CPT

## 2024-05-24 PROCEDURE — 009U3ZX DRAINAGE OF SPINAL CANAL, PERCUTANEOUS APPROACH, DIAGNOSTIC: ICD-10-PCS | Performed by: STUDENT IN AN ORGANIZED HEALTH CARE EDUCATION/TRAINING PROGRAM

## 2024-05-24 PROCEDURE — 99232 SBSQ HOSP IP/OBS MODERATE 35: CPT | Performed by: STUDENT IN AN ORGANIZED HEALTH CARE EDUCATION/TRAINING PROGRAM

## 2024-05-24 RX ORDER — FERROUS SULFATE 325(65) MG
325 TABLET ORAL
Status: DISCONTINUED | OUTPATIENT
Start: 2024-05-24 | End: 2024-05-25 | Stop reason: HOSPADM

## 2024-05-24 RX ADMIN — THIAMINE HYDROCHLORIDE 100 MG: 100 INJECTION, SOLUTION INTRAMUSCULAR; INTRAVENOUS at 05:49

## 2024-05-24 RX ADMIN — FERROUS SULFATE TAB 325 MG (65 MG ELEMENTAL FE) 325 MG: 325 (65 FE) TAB at 09:17

## 2024-05-24 RX ADMIN — FAMOTIDINE 20 MG: 20 TABLET, FILM COATED ORAL at 18:21

## 2024-05-24 RX ADMIN — FAMOTIDINE 20 MG: 20 TABLET, FILM COATED ORAL at 05:49

## 2024-05-24 ASSESSMENT — PAIN DESCRIPTION - PAIN TYPE
TYPE: ACUTE PAIN

## 2024-05-24 ASSESSMENT — ENCOUNTER SYMPTOMS
FEVER: 0
NAUSEA: 0
ABDOMINAL PAIN: 0
SHORTNESS OF BREATH: 0
VOMITING: 0

## 2024-05-24 NOTE — PROGRESS NOTES
Report received from NOC RN and assumed patient care at 0700. Patient is A&Ox 2 and on room air.  Patient reporting a pain level of 0/10. Patient resting comfortably in bed, Work of breathing unlabored.     Call light within reach and bed in lowest position. Reinforced the need to call for assistance. Plan of care discussed and patient does not have any further needs at this time.

## 2024-05-24 NOTE — CARE PLAN
The patient is Stable - Low risk of patient condition declining or worsening    Shift Goals  Clinical Goals: reorient patient as needed, safety, comfort, LP  Patient Goals: comfort, rest  Family Goals: ROBERT    Progress made toward(s) clinical / shift goals:        Problem: Knowledge Deficit - Standard  Goal: Patient and family/care givers will demonstrate understanding of plan of care, disease process/condition, diagnostic tests and medications  5/24/2024 1259 by Zo Leonard R.N.  Outcome: Progressing  Note: Patient and family updated on plan of care by team members.  5/24/2024 1135 by Zo Leonard R.N.  Outcome: Progressing  Note: Patient updated on plan of care by team members. Patient reoriented as needed.       Patient is not progressing towards the following goals:

## 2024-05-24 NOTE — CARE PLAN
The patient is Stable - Low risk of patient condition declining or worsening    Shift Goals  Clinical Goals: reorient patient as needed, safety, comfort, LP  Patient Goals: comfort, rest  Family Goals: ROBERT    Progress made toward(s) clinical / shift goals:        Problem: Knowledge Deficit - Standard  Goal: Patient and family/care givers will demonstrate understanding of plan of care, disease process/condition, diagnostic tests and medications  Outcome: Progressing  Note: Patient updated on plan of care by team members. Patient reoriented as needed.       Patient is not progressing towards the following goals:

## 2024-05-24 NOTE — PROCEDURES
Procedure Lumbar Puncture    Date/Time: 5/24/2024 4:30 PM    Performed by: Aiden Madsen M.D.  Authorized by: Aiden Madsen M.D.    Consent:     Consent obtained:  Verbal and written    Consent given by:  Patient    Risks discussed:  Bleeding, nerve damage, headache, repeat procedure, pain and infection    Alternatives discussed:  No treatment, delayed treatment and observation  Universal protocol:     Procedure explained and questions answered to patient or proxy's satisfaction: yes      Immediately prior to procedure a time out was called: yes      Site/side marked: yes      Patient identity confirmed:  Verbally with patient and provided demographic data  Pre-procedure details:     Procedure purpose:  Diagnostic    Preparation: Patient was prepped and draped in usual sterile fashion    Sedation:     Sedation type:  None  Anesthesia:     Anesthesia method:  Local infiltration    Local anesthetic:  Lidocaine 1% w/o epi  Procedure details:     Lumbar space:  L4-L5 interspace    Patient position:  Sitting    Needle gauge:  20    Needle type:  Spinal needle - Quincke tip    Needle length (in):  3.5    Ultrasound guidance: yes      Number of attempts:  1    Fluid appearance:  Clear    Tubes of fluid:  4    Total volume (ml):  20  Post-procedure:     Puncture site:  Adhesive bandage applied and direct pressure applied    Patient tolerance of procedure:  Tolerated well, no immediate complications

## 2024-05-24 NOTE — PROGRESS NOTES
"Referring Physician: Viktoria Vargas M.D.    S:  No events overnight. Sitting up in bed talking with her family on the phone. Per her request, I spoke to her father on the phone and updated him on progress thus far.    Scheduled Medications   Medication Dose Frequency    ferrous sulfate  325 mg QDAY with Breakfast    famotidine  20 mg BID    enoxaparin (LOVENOX) injection  40 mg DAILY AT 1800    senna-docusate  2 Tablet Q EVENING    Pharmacy Consult Request  1 Each PHARMACY TO DOSE         O:    Vitals:    05/24/24 0725   BP: (!) 87/53   Pulse: 69   Resp: 18   Temp: 35.9 °C (96.7 °F)   SpO2: 97%     Physical Examination:      General: Patient is awake and in no acute distress.      NEUROLOGICAL EXAM:      Mental status: Awake, alert, attentive. Pleasant but somewhat flat affect. States name correctly and today states she is at \"[facility]\", otherwise disoriented to year, situation.    Speech and language: Speech is clear and fluent. The patient is able to name and repeat, and follow commands. Impaired delayed recall (remembered 0 of 5 provided words)  Cranial nerve exam: Pupils are equal, round and reactive to light bilaterally. Visual fields are full. She has bilateral horizontal gaze-evoked nystagmus.  Extraocular muscles are intact. Face is symmetric. Sensation in the face is intact to light touch. Palate elevates symmetrically. Tongue is midline.  Motor exam: There is sustained antigravity with no downward drift in bilateral arms and legs, 4+/5 globally.  There is no pronator drift. Tone is normal. No abnormal movements were seen on exam.  Sensory exam: Reacts to tactile in all 4 extremities, no neglect to double stim   Deep tendon reflexes:  2+ throughout. Toes equivocal favoring down-going bilaterally.  Coordination: No dysmetria on bilateral finger-to-nose or heel-shin testing  Gait: Able to stand unassisted. Stable native gait.        MR-BRAIN-WITH & W/O   Final Result      There are few nonspecific T2 " hyperintensities in the subcortical and periventricular white matter. The differential diagnosis includes nonspecific foci of gliosis and demyelination. 2 of the lesions are periventricular in location. These findings does    not fulfill the Guerrero MR criteria for diagnosing multiple sclerosis. Please correlate with the clinical and other laboratory evidence.        EEG INTERPRETATION (5/23):   Normal video EEG recording in the awake state(s):  - No regional slowing or persistent focal asymmetries were seen.  - No epileptiform discharges or other epileptiform phenomena seen.  - No seizures. Clinical correlation is recommended.       Impression & Recommendations:    Belinda Rodriguez is a 29 y.o. presenting for abdominal pain and pancreatic fluid collection whom neurology has been consulted for memory loss. She has an amnesic syndrome and gaze-evoked horizontal nystagmus in the setting of chronic heavy alcohol use and possible malnutrition. Her symptoms have unclear timing but have been progressive over at least 1 year per her father's history. She does otherwise demonstrate intact complex attention. Denies headache, fever, numbness, vision changes, or paresthesias.      Given her history of heavy alcohol use in about the same timeframe as the onset of her symptoms, presumed diagnosis of Wernicke's encephalopathy. Work-up for alternate etiologies is in progress. EEG normal. MRI brain with no acute findings. LP for CSF analysis is pending.       Plan:  - Agree with checking for nutritional deficiencies, replace as indicated  - Continue thiamine.   - Recommend lumbar puncture, send CSF for : Cell count, protein, glucose, culture/gram stain, meningitis/encephalitis PCR, autoimmune encephalopathy PCR  - Will follow for CSF analysis.     The evaluation of the patient, and recommended management, was discussed with Dr. Overton and the care team      JAMES Rehman  Neurohospitalist Services

## 2024-05-24 NOTE — CARE PLAN
The patient is Stable - Low risk of patient condition declining or worsening    Shift Goals  Clinical Goals: reorient as needed, comfort  Patient Goals: rest  Family Goals: ROBERT    Progress made toward(s) clinical / shift goals:          Patient is not progressing towards the following goals:

## 2024-05-24 NOTE — DIETARY
"Nutrition services: Day 5 of admit.  Belinda Rodriguez is a 29 y.o. female with admitting DX of Pancreatitis, unspecified pancreatitis type.  Consult received for poor oral intake.    Spoke with pt at bedside. She could not remember how much she ate at breakfast today. She does confirm that she avoids gluten and lactose. Pt states she is allergic to peach which causes throat swelling and hives. She has never tried Boost or Ensure, but is willing to receive them with meals. Discussed importance of good PO intake at meals and pt stated understanding.    Pt states she thinks she may have lost weight as she was not eating well, but she has not weighed herself and does not know how much. She reports a usual weight of 110 lb.     Nutrition focused physical assessment: No significant fat or muscle loss.     Assessment:  Height: 157.5 cm (5' 2\")  Weight: 51 kg (112 lb 7 oz)  Body mass index is 20.56 kg/m²., BMI classification: Normal  Diet/Intake: Regular, Gluten free, Lactose free.    Evaluation:   Recorded PO intake this morning and last night was 50-75%, but was <50% prior.  No weight history available in chart review. Current weight is above pt's stated usual weight of 110 lb.  Noted history of alcohol abuse. Thiamine completed per MAR. Recommend additional MVI, Thiamine, and Folic acid. Discussed with MD.  Labs 5/22 include Glu 113 (H), BUN 4 (L), Creat 0.45 (L), Alb 3.3.  No skin injury per RN skin assessment.    Malnutrition Risk: Pt is at risk due to poor appetite and history of alcohol abuse, but unable to meet 2 ASPEN criteria.    Recommendations/Plan:  Ensure Plus or equivalent Boost Plus with meals.   Encourage intake of meals and supplements >50%.  Document intake of all meals and supplements as % taken in ADL's to provide interdisciplinary communication across all shifts.   Monitor weight.  Nutrition rep will continue to see patient for ongoing meal and snack preferences.     RD following      "

## 2024-05-24 NOTE — PROGRESS NOTES
Assumed pt care with RN. Pt is A&OX2 (disoriented to time and situation) and states she is in 0/10 pain but declines interventions at this time. Plan of care discussed, no further questions. Personal belongings and call light within reach.   no

## 2024-05-24 NOTE — PROGRESS NOTES
Hospital Medicine Daily Progress Note    Date of Service  5/24/2024    Chief Complaint  Belinda Rodriguez is a 29 y.o. female admitted 5/19/2024 with abdominal pain.     Hospital Course  Belinda Rodriguez is a 29 yr old female with a PMHx of endometriosis, depression and alcohol abuse. Admitted 5/19 for abdominal pain.     CT A/P:  Large complex multiloculated cystic structure in the pancreas, could represent large pseudocyst, however other cystic pancreatic lesion is not definitively excluded.     MRCP:   Large multiloculated fluid collection replacing the pancreatic parenchyma. This could be related to prior pancreatitis or walled-off necrosis.     Discussed with GI. Plan to continue with conservative management at this time.     MRI brain and trial of lactulose is pending for further evaluation of memory loss.     Interval Problem Update  5/21: No acute overnight events. Patient is unable to answer questions about her health history, living situation, family/support system. MRI brain pending. GI consult today.     5/22: Some improvement in mentation overnight. Patient understands she is in the hospital. She states she has been drinking heavily for an unknown amount of time. She does not remember when she drank last. She thinks she lives with her dad, but is unsure. MRI brain is pending. High dose thiamine started today.      5/23: Vitals stable overnight. Wbc stable at 3.5. Hb stable at 10.6. Platelet stable at 133. Iron 17. MRI brain was largely within normal limits. Neuro consult placed today. Nutrition consult placed today.     5/24: Vitals stable overnight. Iron low at 17. Iron supplementation started. LP planned  for  today. EEG without any obvious abnormalities.     I have discussed this patient's plan of care and discharge plan at IDT rounds today with Case Management, Nursing, Nursing leadership, and other members of the IDT team.    Consultants/Specialty  GI    Code Status  Full Code    Disposition  The  patient is not medically cleared for discharge to home or a post-acute facility.  Anticipate discharge to: home with close outpatient follow-up    I have placed the appropriate orders for post-discharge needs.    Review of Systems  Review of Systems   Constitutional:  Negative for fever.   Respiratory:  Negative for shortness of breath.    Cardiovascular:  Negative for chest pain.   Gastrointestinal:  Negative for abdominal pain, nausea and vomiting.        Physical Exam  Temp:  [35.9 °C (96.7 °F)-36.3 °C (97.4 °F)] 35.9 °C (96.7 °F)  Pulse:  [60-77] 69  Resp:  [18] 18  BP: ()/(51-66) 87/53  SpO2:  [96 %-99 %] 97 %    Physical Exam  Vitals and nursing note reviewed.   Constitutional:       General: She is not in acute distress.     Appearance: Normal appearance. She is not ill-appearing.   Cardiovascular:      Rate and Rhythm: Normal rate and regular rhythm.      Heart sounds: Murmur heard.   Pulmonary:      Effort: Pulmonary effort is normal. No respiratory distress.      Breath sounds: Normal breath sounds. No wheezing.   Abdominal:      General: Abdomen is flat. Bowel sounds are normal. There is no distension.      Palpations: Abdomen is soft.      Tenderness: There is no abdominal tenderness.   Skin:     General: Skin is warm and dry.   Neurological:      Mental Status: She is alert and oriented to person, place, and time.   Psychiatric:         Mood and Affect: Mood normal. Affect is flat.         Behavior: Behavior is cooperative.         Cognition and Memory: Cognition is impaired. Memory is impaired. She exhibits impaired recent memory and impaired remote memory.         Fluids    Intake/Output Summary (Last 24 hours) at 5/24/2024 1137  Last data filed at 5/23/2024 2100  Gross per 24 hour   Intake 340 ml   Output --   Net 340 ml       Laboratory  Recent Labs     05/22/24  0422 05/23/24  0118   WBC 5.3 3.5*   RBC 3.54* 3.68*   HEMOGLOBIN 10.1* 10.6*   HEMATOCRIT 30.1* 31.6*   MCV 85.0 85.9   MCH 28.5  28.8   MCHC 33.6 33.5   RDW 47.5 48.6   PLATELETCT 110* 133*   MPV 9.3 9.3     Recent Labs     05/22/24  0422   SODIUM 139   POTASSIUM 3.9   CHLORIDE 104   CO2 22   GLUCOSE 113*   BUN 4*   CREATININE 0.45*   CALCIUM 8.7                     Imaging  MR-BRAIN-WITH & W/O   Final Result      There are few nonspecific T2 hyperintensities in the subcortical and periventricular white matter. The differential diagnosis includes nonspecific foci of gliosis and demyelination. 2 of the lesions are periventricular in location. These findings does    not fulfill the Guerrero MR criteria for diagnosing multiple sclerosis. Please correlate with the clinical and other laboratory evidence.      MR-ABDOMEN-WITH & W/O   Final Result         1. There is a large multiloculated fluid collection replacing the pancreatic parenchyma. This could be related to prior pancreatitis or walled-off necrosis.      2. No solid or suspicious pancreatic component identified.      CT-ABDOMEN-PELVIS WITH   Final Result         1.  Large complex multiloculated cystic structure in the pancreas, could represent large pseudocyst, however other cystic pancreatic lesion is not definitively excluded. Recommend follow-up MRI of the pancreas with contrast for further characterization    as clinically appropriate.   2.  Slight peripancreatic fat stranding, consider component of pancreatitis.   3.  Mild intrahepatic biliary ductal dilatation   4.  Trace free fluid collection the pelvis      These findings were discussed with the patient's clinician, Brendan Multani, on 5/19/2024 9:02 PM.      US-RUQ   Final Result      1.  Tubular fluid-filled structure at the midline abdomen measuring up to 6 cm transverse. This may be aberrantly dilated bowel. Recommend further assessment with CT abdomen and pelvis.   2.  Bidirectional flow in the main portal vein which is suspicious for portal hypertension.   3.  Collateral veins at the liver and spleen.   4.  The liver is  diffusely increased in echogenicity.  This is nonspecific though can be seen with fatty infiltration or other hepatocellular disease.           Assessment/Plan  * Encephalopathy  Assessment & Plan  Significant short term memory loss. Unable to communicate where she lives; become lost in the hallways; unable to communicate regarding friends/family in the area.   Possible secondary to Wernicke given history of alcohol abuse     - MRI brain largely within normal limits  - EEG diffuse slowing, epileptiform or seizure activity   - No improvement with trial of lactulose-  discontinue at this time  - Continue IV thiamine Day 3/3 today; completed   - Discussed with neurology- JAMES Rehman  - LP ordered for today     Iron deficiency anemia secondary to inadequate dietary iron intake  Assessment & Plan  Iron low at 17  - Start iron 325 daily with meal    Poor appetite  Assessment & Plan  Patient unable to quantify how much weight she has lost.  However, her father states she has very poor appetite and poor p.o. intake at home.  - Nutrition consult pending    ACP (advance care planning)  Assessment & Plan  Addendum: long conversation with patient's father-Lopez.  He states that patient has been away from [place] for the past 6 years.  She moved back to [place] 2 months ago.  She has been living with her father for the last month.  Approximately 2 to 3 months ago she was hospitalized in [place] for 3 weeks.  She underwent alcohol withdrawal during the hospitalization.  Father notes worsening memory concerns over the last year.  He was told that the doctors in [place] could not identify an etiology for her memory changes.  Her father states that since moving to [place]-he would tell her where they live and a few minutes later she would ask again because she had already forgotten.  She does not have insurance so she has not established with a therapist or physician in [place].  ACP time spent 22 minutes.    Memory  deficit  Assessment & Plan  Secondary to Wernicke vs other processes    Hypokalemia  Assessment & Plan  Monitor and repalce    Hyperglycemia  Assessment & Plan  A1c 4.8    Thrombocytopenia (HCC)  Assessment & Plan  Platelet 133  Secondary to alcohol abuse   - Monitor closely for signs of bleeding  - Repeat CBC in AM    Pancreatitis, pancreatic cyst- (present on admission)  Assessment & Plan  MRCP: large multiloculated fluid collection replacing the pancreatic parenchyma. This could be related to prior pancreatitis or walled-off necrosis.   - Discussed with GI today; continue with non operative management   - Continue medical management at this time   - If increasing WBC, fevers, change in abdominal exam - consider drainage          VTE prophylaxis:   SCDs/TEDs      I have performed a physical exam and reviewed and updated ROS and Plan today (5/24/2024). In review of yesterday's note (5/23/2024), there are no changes except as documented above.

## 2024-05-25 ENCOUNTER — PHARMACY VISIT (OUTPATIENT)
Dept: PHARMACY | Facility: MEDICAL CENTER | Age: 29
End: 2024-05-25
Payer: COMMERCIAL

## 2024-05-25 VITALS
HEART RATE: 68 BPM | SYSTOLIC BLOOD PRESSURE: 100 MMHG | HEIGHT: 62 IN | TEMPERATURE: 97.2 F | OXYGEN SATURATION: 100 % | BODY MASS INDEX: 21.99 KG/M2 | DIASTOLIC BLOOD PRESSURE: 60 MMHG | RESPIRATION RATE: 20 BRPM | WEIGHT: 119.49 LBS

## 2024-05-25 LAB
ANNOTATION COMMENT IMP: NORMAL
C GATTII+NEOFOR DNA CSF QL NAA+NON-PROBE: NOT DETECTED
CMV DNA CSF QL NAA+NON-PROBE: NOT DETECTED
E COLI K1 DNA CSF QL NAA+NON-PROBE: NOT DETECTED
EV RNA CSF QL NAA+NON-PROBE: NOT DETECTED
GP B STREP DNA CSF QL NAA+NON-PROBE: NOT DETECTED
HAEM INFLU DNA CSF QL NAA+NON-PROBE: NOT DETECTED
HHV6 DNA CSF QL NAA+NON-PROBE: NOT DETECTED
HSV1 DNA CSF QL NAA+NON-PROBE: NOT DETECTED
HSV2 DNA CSF QL NAA+NON-PROBE: NOT DETECTED
L MONOCYTOG DNA CSF QL NAA+NON-PROBE: NOT DETECTED
N MEN DNA CSF QL NAA+NON-PROBE: NOT DETECTED
PARECHOVIRUS A RNA CSF QL NAA+NON-PROBE: NOT DETECTED
RETINYL PALMITATE SERPL-MCNC: <0.02 MG/L (ref 0–0.1)
S PNEUM DNA CSF QL NAA+NON-PROBE: NOT DETECTED
VIT A SERPL-MCNC: 0.33 MG/L (ref 0.3–1.2)
VZV DNA CSF QL NAA+NON-PROBE: NOT DETECTED

## 2024-05-25 PROCEDURE — RXMED WILLOW AMBULATORY MEDICATION CHARGE: Performed by: STUDENT IN AN ORGANIZED HEALTH CARE EDUCATION/TRAINING PROGRAM

## 2024-05-25 PROCEDURE — 99232 SBSQ HOSP IP/OBS MODERATE 35: CPT

## 2024-05-25 PROCEDURE — 99239 HOSP IP/OBS DSCHRG MGMT >30: CPT | Mod: 25 | Performed by: STUDENT IN AN ORGANIZED HEALTH CARE EDUCATION/TRAINING PROGRAM

## 2024-05-25 PROCEDURE — 99497 ADVNCD CARE PLAN 30 MIN: CPT | Performed by: STUDENT IN AN ORGANIZED HEALTH CARE EDUCATION/TRAINING PROGRAM

## 2024-05-25 RX ORDER — LANOLIN ALCOHOL/MO/W.PET/CERES
100 CREAM (GRAM) TOPICAL DAILY
Qty: 30 TABLET | Refills: 1 | Status: SHIPPED | OUTPATIENT
Start: 2024-05-25

## 2024-05-25 RX ORDER — FERROUS SULFATE 325(65) MG
325 TABLET ORAL
Qty: 30 TABLET | Refills: 1 | Status: SHIPPED | OUTPATIENT
Start: 2024-05-25

## 2024-05-25 RX ADMIN — FAMOTIDINE 20 MG: 20 TABLET, FILM COATED ORAL at 05:49

## 2024-05-25 RX ADMIN — FERROUS SULFATE TAB 325 MG (65 MG ELEMENTAL FE) 325 MG: 325 (65 FE) TAB at 08:47

## 2024-05-25 ASSESSMENT — PAIN DESCRIPTION - PAIN TYPE
TYPE: ACUTE PAIN

## 2024-05-25 ASSESSMENT — PATIENT HEALTH QUESTIONNAIRE - PHQ9
2. FEELING DOWN, DEPRESSED, IRRITABLE, OR HOPELESS: NOT AT ALL
1. LITTLE INTEREST OR PLEASURE IN DOING THINGS: NOT AT ALL
SUM OF ALL RESPONSES TO PHQ9 QUESTIONS 1 AND 2: 0

## 2024-05-25 ASSESSMENT — FIBROSIS 4 INDEX: FIB4 SCORE: .982456140350877193

## 2024-05-25 NOTE — CARE PLAN
The patient is Stable - Low risk of patient condition declining or worsening    Shift Goals  Clinical Goals: Comfort, rest, asssess safety to go home  Patient Goals: rest, comfort  Family Goals: safety    Progress made toward(s) clinical / shift goals:    Problem: Knowledge Deficit - Standard  Goal: Patient and family/care givers will demonstrate understanding of plan of care, disease process/condition, diagnostic tests and medications  Outcome: Met   Patient still shows signs of confusion and disorientation at certain times, however, patient understands their plan of care and verbalizes want to better themselves outpt.     Patient is not progressing towards the following goals:

## 2024-05-25 NOTE — PROGRESS NOTES
Patient was resting after eating her dinner. She is in supine position. Denied any headache or any other symptom post-lumbar punction. Hourly rounding in place. Bed in lowest and locked position. Call bell within reach.

## 2024-05-25 NOTE — CARE PLAN
The patient is Stable - Low risk of patient condition declining or worsening    Shift Goals  Clinical Goals: comfort post LP, rest  Patient Goals: rest, comfort  Family Goals: NA    Progress made toward(s) clinical / shift goals:  Patient slept most of the night. BP was soft. Patient did not complain of any headache, nausea, vomiting or abdominal pain.

## 2024-05-25 NOTE — DISCHARGE PLANNING
Patient to transfer to her home today at [id number] via [facility]. RSV: [id number] cost is $ 123.09.  ALCIRA Duron advised of transport time.

## 2024-05-25 NOTE — PROGRESS NOTES
Report received from NOC RN and assumed patient care at 0700. Patient is A&Ox 2 disoriented to time and situation, on room air. Patient reporting a pain level of 0/10. Call light within reach and bed in lowest position. Reinforced the need to call for assistance. Plan of care discussed and patient does not have any further needs at this time.

## 2024-05-25 NOTE — PROGRESS NOTES
"Referring Physician: Viktoria Vargas M.D.    S:  No events overnight. Sleeping in bed, wakes easily to voice. No significant neuro change compared to yesterday States year is 2027, does not remember talking to her father yesterday.     Scheduled Medications   Medication Dose Frequency    ferrous sulfate  325 mg QDAY with Breakfast    famotidine  20 mg BID    enoxaparin (LOVENOX) injection  40 mg DAILY AT 1800    senna-docusate  2 Tablet Q EVENING    Pharmacy Consult Request  1 Each PHARMACY TO DOSE         O:    Vitals:    05/25/24 0737   BP: 94/51   Pulse: (!) 53   Resp: 16   Temp: (!) 35.8 °C (96.4 °F)   SpO2: 96%     Physical Examination:      General: Patient is awake and in no acute distress. Temporal hair thinning.      NEUROLOGICAL EXAM:      Mental status: Wakes easily and remains alert and attentive. States name correctly  and tells me her sister's name is \"Herminia\" however states year is \"2027\" and does not remember speaking to her family on the phone yesterday.     Speech and language: Speech is clear and fluent. The patient is able to name and repeat, and follow commands. Impaired delayed recall  Cranial nerve exam: Pupils are equal, round and reactive to light bilaterally. Visual fields are full. She has bilateral horizontal gaze-evoked nystagmus.  Extraocular muscles are intact. Face is symmetric. Sensation in the face is intact to light touch. Palate elevates symmetrically. Tongue is midline.  Motor exam: There is sustained antigravity with no downward drift in bilateral arms and legs, 4+/5 globally.  There is no pronator drift. Tone is normal. No abnormal movements were seen on exam.  Sensory exam: Reacts to tactile in all 4 extremities, no neglect to double stim   Deep tendon reflexes:  2+ throughout. Toes equivocal favoring down-going bilaterally.  Coordination: No dysmetria on bilateral finger-to-nose or heel-shin testing  Gait: Able to stand unassisted. Stable native gait.        MR-BRAIN-WITH & " W/O   Final Result      There are few nonspecific T2 hyperintensities in the subcortical and periventricular white matter. The differential diagnosis includes nonspecific foci of gliosis and demyelination. 2 of the lesions are periventricular in location. These findings does    not fulfill the Guerrero MR criteria for diagnosing multiple sclerosis. Please correlate with the clinical and other laboratory evidence.        EEG INTERPRETATION (5/23):   Normal video EEG recording in the awake state(s):  - No regional slowing or persistent focal asymmetries were seen.  - No epileptiform discharges or other epileptiform phenomena seen.  - No seizures. Clinical correlation is recommended.       Impression & Recommendations:    Belinda Rodriguez is a 29 y.o. presenting for abdominal pain and pancreatic fluid collection whom neurology has been consulted for memory loss. She has an amnesic syndrome and gaze-evoked horizontal nystagmus in the setting of chronic heavy alcohol use and possible malnutrition. Her symptoms have unclear timing but have been progressive over at least 1 year per her father's history. She does otherwise demonstrate intact complex attention. Denies headache, fever, numbness, vision changes, or paresthesias.      Given her history of heavy alcohol use in about the same timeframe as the onset of her symptoms, presumed diagnosis of Wernicke's encephalopathy (WE). Work-up for alternate etiologies was performed. EEG normal. MRI brain with no acute findings. LP for CSF analysis was done - CSF is bland, without pleocytosis or elevated protein. At this point believe her symptoms are secondary to WE. Has not had any significant improvement after high dose thiamine - she may have permanent residual deficit which is very common in WE. Due to severe memory deficit, will need long term assistance at home from either family or memory care.       Plan:  - Replace and supplement nutritional deficiencies as indicated  -  Recommend she continue oral thiamine supplement.  - Recommend she follow closely with outpatient neurology, as well as getting formal neuropsychological evaluation  - Will follow pending CSF labs peripherally. Please reach out for questions/concerns.  Reconsult if needed.      The evaluation of the patient, and recommended management, was discussed with Dr. Overton and the care team      JAMES Rehman  Neurohospitalist Services

## 2024-05-25 NOTE — DISCHARGE PLANNING
Case Management Discharge Planning    Admission Date: 5/19/2024  GMLOS: 3.1  ALOS: 6    6-Clicks ADL Score: 24  6-Clicks Mobility Score: 24    Anticipated Discharge Dispo: Discharge Disposition: Discharged to home/self care (01)  Discharge Address: [address] [address]  Discharge Contact Phone Number: [phone]    DME Needed: No    Action(s) Taken:   Pt discussed during IDT rounds. Per MD, pt is medically clear.     CM RN attempted to contact pt's father Lopez ([phone]) to discuss discharge plan and arrange transportation home. Left VM with direct contact information requesting call back.     1200  CM RN contacted pt's father Lopez who confirmed address on file is correct address for transport. Per Lopez he is to be home to receive pt upon her arrival.     CM RN requested transport via [facility]. Pending confirmation.    1222  CM RN received confirmation for transport via [facility] scheduled for  time of 5383-4754 today 5/25.   MD and bedside RN informed.     ASF faxed to [facility]. Pt uninsured and unable to pay OOP for transport.     Escalations Completed: None    Medically Clear: Yes    Next Steps:  CM RN to follow up with medical team to discuss discharge barriers or needs.     Barriers to Discharge: None

## 2024-05-25 NOTE — CARE PLAN
The patient is     Shift Goals  Clinical Goals: comfort post LP, rest  Patient Goals: rest, comfort  Family Goals: NA    Progress made toward(s) clinical / shift goals:  Patient slept most of the night. BP was soft. Patient did not complain of any headache, nausea, vomiting or abdominal pain.       :

## 2024-05-25 NOTE — DISCHARGE SUMMARY
Discharge Summary    CHIEF COMPLAINT ON ADMISSION  Chief Complaint   Patient presents with    Abdominal Pain    Nausea/Vomiting/Diarrhea       Reason for Admission  Abdominal Pain, Nausea     Admission Date  5/19/2024    CODE STATUS  Full Code    HPI & HOSPITAL COURSE    Belinda Rodriguez is a 29 yr old female with a PMHx of endometriosis, depression and alcohol abuse. Admitted 5/19 for abdominal pain.     CT A/P:  Large complex multiloculated cystic structure in the pancreas, could represent large pseudocyst, however other cystic pancreatic lesion is not definitively excluded.     MRCP:   Large multiloculated fluid collection replacing the pancreatic parenchyma. This could be related to prior pancreatitis or walled-off necrosis. Discussed with GI. Plan to continue with conservative management at this time.  If patient develops increasing white count or worsening pain she may need surgical intervention in the future.    During hospitalization it became quite apparent that patient has significant memory impairment.  MRI brain was largely within normal limits.  Neurology was consulted.  Recommending a lumbar puncture-which was unremarkable.  Autoimmune panel is still pending.  EEG was within normal limits.  Ultimately, it appears that her long-term and heavy levels of alcohol consumption have significantly impacted her memory and her recall.  Her long-term memory is intact however, her short-term memory is severely diminished.  Patient will require close outpatient monitoring and likely lifelong support and caretaking to help with her memory loss. ACP time spent: 22 minutes    I have discussed this with patient's father, Lopez.  He understands that she is safe for discharge home at this time.  However, he will work with her sister and family to come up with a long-term solution to support patient. They are working on Medicaid application and disability applications at this time.  Referral for outpatient neurology has  been placed.    Iron has been started for iron deficiency anemia.  Patient is discharged home on a multivitamin and thiamine.  I have placed referrals to establish with primary care provider.    Therefore, she is discharged in good and stable condition to home with close outpatient follow-up.    The patient met 2-midnight criteria for an inpatient stay at the time of discharge.    Discharge Date  5/25/2024    FOLLOW UP ITEMS POST DISCHARGE  Establish with primary care soon as possible  Established with outpatient neurology as soon as possible    DISCHARGE DIAGNOSES  Principal Problem:    Encephalopathy (POA: Unknown)  Active Problems:    Pancreatitis, pancreatic cyst (POA: Yes)    Thrombocytopenia (HCC) (POA: Unknown)    Hyperglycemia (POA: Unknown)    Hypokalemia (POA: Unknown)    Memory deficit (POA: Unknown)    ACP (advance care planning) (POA: Unknown)    Poor appetite (POA: Unknown)    Iron deficiency anemia secondary to inadequate dietary iron intake (POA: Unknown)  Resolved Problems:    * No resolved hospital problems. *      FOLLOW UP  No future appointments.  No follow-up provider specified.    MEDICATIONS ON DISCHARGE     Medication List        START taking these medications        Instructions   ferrous sulfate 325 (65 Fe) MG tablet   Take 1 Tablet by mouth every morning with breakfast.  Dose: 325 mg     thiamine 100 MG tablet  Commonly known as: Thiamine   Take 1 Tablet by mouth every day.  Dose: 100 mg            CONTINUE taking these medications        Instructions   B COMPLEX VITAMINS PO   Take 1 Tablet by mouth every day.  Dose: 1 Tablet     MULTIVITAMIN GUMMIES ADULT PO   Take 1 Each by mouth every day.  Dose: 1 Each     POTASSIUM CITRATE PO   Take 1 Tablet by mouth every day.  Dose: 1 Tablet            STOP taking these medications      PRENATAL PO              Allergies  Allergies   Allergen Reactions    Peach [Prunus Persica] Itching       DIET  Orders Placed This Encounter   Procedures    Diet  Order Diet: Regular; Miscellaneous modifications: (optional): Gluten Free, Lactose Free     Standing Status:   Standing     Number of Occurrences:   1     Order Specific Question:   Diet:     Answer:   Regular [1]     Order Specific Question:   Miscellaneous modifications: (optional)     Answer:   Gluten Free [9]     Order Specific Question:   Miscellaneous modifications: (optional)     Answer:   Lactose Free [5]       ACTIVITY  As tolerated.  Weight bearing as tolerated    CONSULTATIONS  Neurology  GI    PROCEDURES  EEG       LABORATORY  Lab Results   Component Value Date    SODIUM 139 05/22/2024    POTASSIUM 3.9 05/22/2024    CHLORIDE 104 05/22/2024    CO2 22 05/22/2024    GLUCOSE 113 (H) 05/22/2024    BUN 4 (L) 05/22/2024    CREATININE 0.45 (L) 05/22/2024        Lab Results   Component Value Date    WBC 3.5 (L) 05/23/2024    HEMOGLOBIN 10.6 (L) 05/23/2024    HEMATOCRIT 31.6 (L) 05/23/2024    PLATELETCT 133 (L) 05/23/2024        Total time of the discharge process exceeds 52 minutes.

## 2024-05-27 LAB
BACTERIA CSF CULT: NORMAL
GRAM STN SPEC: NORMAL
SIGNIFICANT IND 70042: NORMAL
SITE SITE: NORMAL
SOURCE SOURCE: NORMAL

## 2024-05-28 ENCOUNTER — TELEPHONE (OUTPATIENT)
Dept: HEALTH INFORMATION MANAGEMENT | Facility: OTHER | Age: 29
End: 2024-05-28

## 2024-05-28 LAB — VDRL CSF QL: NON REACTIVE

## 2024-05-30 ENCOUNTER — PATIENT OUTREACH (OUTPATIENT)
Dept: HEALTH INFORMATION MANAGEMENT | Facility: OTHER | Age: 29
End: 2024-05-30

## 2024-06-05 ENCOUNTER — TELEPHONE (OUTPATIENT)
Dept: HEALTH INFORMATION MANAGEMENT | Facility: OTHER | Age: 29
End: 2024-06-05

## 2024-06-06 LAB — TEST NAME 95000: NORMAL

## 2024-06-11 NOTE — PROGRESS NOTES
[facility] Aracely attempted to contact pt's father Lopez to follow up on resources provided. [facility] was unable to reach pt's father Lopez. [facility] contact information was provided via phone and text message. [facility] will not continue to follow at this time.

## 2024-06-14 LAB — TEST NAME 95000: NORMAL
